# Patient Record
Sex: FEMALE | Race: WHITE | NOT HISPANIC OR LATINO | Employment: UNEMPLOYED | URBAN - METROPOLITAN AREA
[De-identification: names, ages, dates, MRNs, and addresses within clinical notes are randomized per-mention and may not be internally consistent; named-entity substitution may affect disease eponyms.]

---

## 2020-01-06 ENCOUNTER — APPOINTMENT (OUTPATIENT)
Dept: CT IMAGING | Facility: HOSPITAL | Age: 30
End: 2020-01-06

## 2020-01-06 ENCOUNTER — HOSPITAL ENCOUNTER (INPATIENT)
Facility: HOSPITAL | Age: 30
LOS: 4 days | Discharge: HOME | End: 2020-01-10
Attending: EMERGENCY MEDICINE | Admitting: HOSPITALIST

## 2020-01-06 DIAGNOSIS — F11.93 OPIATE WITHDRAWAL (HCC): ICD-10-CM

## 2020-01-06 DIAGNOSIS — Z99.81 DEPENDENCE ON NOCTURNAL OXYGEN THERAPY: ICD-10-CM

## 2020-01-06 DIAGNOSIS — N12 PYELONEPHRITIS: Primary | ICD-10-CM

## 2020-01-06 DIAGNOSIS — F13.939 BENZODIAZEPINE WITHDRAWAL WITH COMPLICATION (HCC): ICD-10-CM

## 2020-01-06 LAB
ALBUMIN SERPL-MCNC: 3.1 G/DL (ref 3.5–5.2)
ALBUMIN/GLOB SERPL: 0.8 G/DL
ALP SERPL-CCNC: 87 U/L (ref 39–117)
ALT SERPL W P-5'-P-CCNC: 20 U/L (ref 1–33)
AMPHET+METHAMPHET UR QL: NEGATIVE
AMPHETAMINES UR QL: NEGATIVE
ANION GAP SERPL CALCULATED.3IONS-SCNC: 17.7 MMOL/L (ref 5–15)
AST SERPL-CCNC: 16 U/L (ref 1–32)
B-HCG UR QL: NEGATIVE
BACTERIA UR QL AUTO: ABNORMAL /HPF
BARBITURATES UR QL SCN: NEGATIVE
BASOPHILS # BLD AUTO: 0.01 10*3/MM3 (ref 0–0.2)
BASOPHILS NFR BLD AUTO: 0.1 % (ref 0–1.5)
BENZODIAZ UR QL SCN: POSITIVE
BILIRUB SERPL-MCNC: 0.6 MG/DL (ref 0.2–1.2)
BILIRUB UR QL STRIP: NEGATIVE
BUN BLD-MCNC: 10 MG/DL (ref 6–20)
BUN/CREAT SERPL: 9.9 (ref 7–25)
BUPRENORPHINE SERPL-MCNC: NEGATIVE NG/ML
CALCIUM SPEC-SCNC: 7.8 MG/DL (ref 8.6–10.5)
CANNABINOIDS SERPL QL: NEGATIVE
CHLORIDE SERPL-SCNC: 89 MMOL/L (ref 98–107)
CLARITY UR: CLEAR
CO2 SERPL-SCNC: 23.3 MMOL/L (ref 22–29)
COCAINE UR QL: NEGATIVE
COLOR UR: YELLOW
CREAT BLD-MCNC: 1.01 MG/DL (ref 0.57–1)
CRP SERPL-MCNC: 29.41 MG/DL (ref 0–0.5)
D-LACTATE SERPL-SCNC: 0.9 MMOL/L (ref 0.5–2)
DEPRECATED RDW RBC AUTO: 48.9 FL (ref 37–54)
EOSINOPHIL # BLD AUTO: 0 10*3/MM3 (ref 0–0.4)
EOSINOPHIL NFR BLD AUTO: 0 % (ref 0.3–6.2)
ERYTHROCYTE [DISTWIDTH] IN BLOOD BY AUTOMATED COUNT: 15 % (ref 12.3–15.4)
ERYTHROCYTE [SEDIMENTATION RATE] IN BLOOD: 41 MM/HR (ref 0–20)
ETHANOL BLD-MCNC: <10 MG/DL (ref 0–10)
ETHANOL UR QL: <0.01 %
FLUAV AG NPH QL: NEGATIVE
FLUBV AG NPH QL IA: NEGATIVE
GFR SERPL CREATININE-BSD FRML MDRD: 65 ML/MIN/1.73
GLOBULIN UR ELPH-MCNC: 3.7 GM/DL
GLUCOSE BLD-MCNC: 132 MG/DL (ref 65–99)
GLUCOSE UR STRIP-MCNC: NEGATIVE MG/DL
HBA1C MFR BLD: 5 % (ref 4.8–5.6)
HCT VFR BLD AUTO: 34 % (ref 34–46.6)
HGB BLD-MCNC: 11.3 G/DL (ref 12–15.9)
HGB UR QL STRIP.AUTO: ABNORMAL
HYALINE CASTS UR QL AUTO: ABNORMAL /LPF
IMM GRANULOCYTES # BLD AUTO: 0.13 10*3/MM3 (ref 0–0.05)
IMM GRANULOCYTES NFR BLD AUTO: 0.8 % (ref 0–0.5)
KETONES UR QL STRIP: NEGATIVE
LEUKOCYTE ESTERASE UR QL STRIP.AUTO: ABNORMAL
LIPASE SERPL-CCNC: 6 U/L (ref 13–60)
LYMPHOCYTES # BLD AUTO: 0.51 10*3/MM3 (ref 0.7–3.1)
LYMPHOCYTES NFR BLD AUTO: 3.1 % (ref 19.6–45.3)
MCH RBC QN AUTO: 29 PG (ref 26.6–33)
MCHC RBC AUTO-ENTMCNC: 33.2 G/DL (ref 31.5–35.7)
MCV RBC AUTO: 87.4 FL (ref 79–97)
METHADONE UR QL SCN: POSITIVE
MONOCYTES # BLD AUTO: 1.41 10*3/MM3 (ref 0.1–0.9)
MONOCYTES NFR BLD AUTO: 8.6 % (ref 5–12)
NEUTROPHILS # BLD AUTO: 14.33 10*3/MM3 (ref 1.7–7)
NEUTROPHILS NFR BLD AUTO: 87.4 % (ref 42.7–76)
NITRITE UR QL STRIP: POSITIVE
OPIATES UR QL: NEGATIVE
OXYCODONE UR QL SCN: NEGATIVE
PCP UR QL SCN: NEGATIVE
PH UR STRIP.AUTO: 6 [PH] (ref 4.5–8)
PLATELET # BLD AUTO: 133 10*3/MM3 (ref 140–450)
PMV BLD AUTO: 11.4 FL (ref 6–12)
POTASSIUM BLD-SCNC: 2.6 MMOL/L (ref 3.5–5.2)
PROCALCITONIN SERPL-MCNC: 5.17 NG/ML (ref 0.1–0.25)
PROPOXYPH UR QL: NEGATIVE
PROT SERPL-MCNC: 6.8 G/DL (ref 6–8.5)
PROT UR QL STRIP: ABNORMAL
RBC # BLD AUTO: 3.89 10*6/MM3 (ref 3.77–5.28)
RBC # UR: ABNORMAL /HPF
REF LAB TEST METHOD: ABNORMAL
S PYO AG THROAT QL: NEGATIVE
SODIUM BLD-SCNC: 130 MMOL/L (ref 136–145)
SP GR UR STRIP: 1.01 (ref 1–1.03)
SQUAMOUS #/AREA URNS HPF: ABNORMAL /HPF
TRICYCLICS UR QL SCN: NEGATIVE
TSH SERPL DL<=0.05 MIU/L-ACNC: 0.32 UIU/ML (ref 0.27–4.2)
UROBILINOGEN UR QL STRIP: ABNORMAL
WBC NRBC COR # BLD: 16.39 10*3/MM3 (ref 3.4–10.8)
WBC UR QL AUTO: ABNORMAL /HPF

## 2020-01-06 PROCEDURE — 25010000002 LORAZEPAM PER 2 MG: Performed by: EMERGENCY MEDICINE

## 2020-01-06 PROCEDURE — 87088 URINE BACTERIA CULTURE: CPT | Performed by: EMERGENCY MEDICINE

## 2020-01-06 PROCEDURE — 25010000002 VANCOMYCIN PER 500 MG: Performed by: NURSE PRACTITIONER

## 2020-01-06 PROCEDURE — 86140 C-REACTIVE PROTEIN: CPT | Performed by: NURSE PRACTITIONER

## 2020-01-06 PROCEDURE — 84145 PROCALCITONIN (PCT): CPT | Performed by: NURSE PRACTITIONER

## 2020-01-06 PROCEDURE — 87880 STREP A ASSAY W/OPTIC: CPT | Performed by: EMERGENCY MEDICINE

## 2020-01-06 PROCEDURE — 74176 CT ABD & PELVIS W/O CONTRAST: CPT

## 2020-01-06 PROCEDURE — 94760 N-INVAS EAR/PLS OXIMETRY 1: CPT

## 2020-01-06 PROCEDURE — 87040 BLOOD CULTURE FOR BACTERIA: CPT | Performed by: NURSE PRACTITIONER

## 2020-01-06 PROCEDURE — 84443 ASSAY THYROID STIM HORMONE: CPT | Performed by: NURSE PRACTITIONER

## 2020-01-06 PROCEDURE — 99283 EMERGENCY DEPT VISIT LOW MDM: CPT | Performed by: EMERGENCY MEDICINE

## 2020-01-06 PROCEDURE — 87081 CULTURE SCREEN ONLY: CPT | Performed by: EMERGENCY MEDICINE

## 2020-01-06 PROCEDURE — 87186 SC STD MICRODIL/AGAR DIL: CPT | Performed by: EMERGENCY MEDICINE

## 2020-01-06 PROCEDURE — 25010000002 CEFTRIAXONE SODIUM-DEXTROSE 1-3.74 GM-%(50ML) RECONSTITUTED SOLUTION: Performed by: EMERGENCY MEDICINE

## 2020-01-06 PROCEDURE — 25010000002 DICYCLOMINE PER 20 MG: Performed by: EMERGENCY MEDICINE

## 2020-01-06 PROCEDURE — 25010000002 VANCOMYCIN 750 MG RECONSTITUTED SOLUTION 1 EACH VIAL: Performed by: NURSE PRACTITIONER

## 2020-01-06 PROCEDURE — 94799 UNLISTED PULMONARY SVC/PX: CPT

## 2020-01-06 PROCEDURE — 85025 COMPLETE CBC W/AUTO DIFF WBC: CPT | Performed by: EMERGENCY MEDICINE

## 2020-01-06 PROCEDURE — 80053 COMPREHEN METABOLIC PANEL: CPT | Performed by: EMERGENCY MEDICINE

## 2020-01-06 PROCEDURE — 83605 ASSAY OF LACTIC ACID: CPT | Performed by: NURSE PRACTITIONER

## 2020-01-06 PROCEDURE — 87804 INFLUENZA ASSAY W/OPTIC: CPT | Performed by: EMERGENCY MEDICINE

## 2020-01-06 PROCEDURE — 25010000003 POTASSIUM CHLORIDE 10 MEQ/100ML SOLUTION: Performed by: EMERGENCY MEDICINE

## 2020-01-06 PROCEDURE — 87086 URINE CULTURE/COLONY COUNT: CPT | Performed by: EMERGENCY MEDICINE

## 2020-01-06 PROCEDURE — 81025 URINE PREGNANCY TEST: CPT | Performed by: EMERGENCY MEDICINE

## 2020-01-06 PROCEDURE — 99223 1ST HOSP IP/OBS HIGH 75: CPT | Performed by: NURSE PRACTITIONER

## 2020-01-06 PROCEDURE — 83036 HEMOGLOBIN GLYCOSYLATED A1C: CPT | Performed by: NURSE PRACTITIONER

## 2020-01-06 PROCEDURE — 81015 MICROSCOPIC EXAM OF URINE: CPT | Performed by: EMERGENCY MEDICINE

## 2020-01-06 PROCEDURE — 80307 DRUG TEST PRSMV CHEM ANLYZR: CPT | Performed by: EMERGENCY MEDICINE

## 2020-01-06 PROCEDURE — 83690 ASSAY OF LIPASE: CPT | Performed by: EMERGENCY MEDICINE

## 2020-01-06 PROCEDURE — 25010000002 ONDANSETRON PER 1 MG: Performed by: EMERGENCY MEDICINE

## 2020-01-06 PROCEDURE — 99285 EMERGENCY DEPT VISIT HI MDM: CPT

## 2020-01-06 PROCEDURE — 81003 URINALYSIS AUTO W/O SCOPE: CPT | Performed by: EMERGENCY MEDICINE

## 2020-01-06 PROCEDURE — 85652 RBC SED RATE AUTOMATED: CPT | Performed by: NURSE PRACTITIONER

## 2020-01-06 RX ORDER — CHOLECALCIFEROL (VITAMIN D3) 125 MCG
5 CAPSULE ORAL NIGHTLY PRN
Status: DISCONTINUED | OUTPATIENT
Start: 2020-01-06 | End: 2020-01-10 | Stop reason: HOSPADM

## 2020-01-06 RX ORDER — POTASSIUM CHLORIDE 7.45 MG/ML
10 INJECTION INTRAVENOUS ONCE
Status: COMPLETED | OUTPATIENT
Start: 2020-01-06 | End: 2020-01-06

## 2020-01-06 RX ORDER — DICYCLOMINE HYDROCHLORIDE 10 MG/ML
20 INJECTION INTRAMUSCULAR ONCE
Status: COMPLETED | OUTPATIENT
Start: 2020-01-06 | End: 2020-01-06

## 2020-01-06 RX ORDER — METHADONE HYDROCHLORIDE 10 MG/1
30 TABLET ORAL EVERY 6 HOURS PRN
Status: ON HOLD | COMMUNITY
End: 2020-01-06

## 2020-01-06 RX ORDER — CARBAMAZEPINE 100 MG/1
200 CAPSULE, EXTENDED RELEASE ORAL EVERY 12 HOURS SCHEDULED
Status: DISCONTINUED | OUTPATIENT
Start: 2020-01-06 | End: 2020-01-07 | Stop reason: CLARIF

## 2020-01-06 RX ORDER — FLUOXETINE HYDROCHLORIDE 20 MG/1
20 CAPSULE ORAL DAILY
Status: DISCONTINUED | OUTPATIENT
Start: 2020-01-07 | End: 2020-01-06

## 2020-01-06 RX ORDER — CEFTRIAXONE 1 G/50ML
1 INJECTION, SOLUTION INTRAVENOUS ONCE
Status: COMPLETED | OUTPATIENT
Start: 2020-01-06 | End: 2020-01-06

## 2020-01-06 RX ORDER — ONDANSETRON 2 MG/ML
4 INJECTION INTRAMUSCULAR; INTRAVENOUS ONCE
Status: COMPLETED | OUTPATIENT
Start: 2020-01-06 | End: 2020-01-06

## 2020-01-06 RX ORDER — ACETAMINOPHEN 500 MG
TABLET ORAL
Status: DISPENSED
Start: 2020-01-06 | End: 2020-01-07

## 2020-01-06 RX ORDER — LORAZEPAM 2 MG/ML
1 INJECTION INTRAMUSCULAR ONCE
Status: COMPLETED | OUTPATIENT
Start: 2020-01-06 | End: 2020-01-06

## 2020-01-06 RX ORDER — FLUOXETINE HYDROCHLORIDE 20 MG/1
20 CAPSULE ORAL DAILY
Status: DISCONTINUED | OUTPATIENT
Start: 2020-01-06 | End: 2020-01-10 | Stop reason: HOSPADM

## 2020-01-06 RX ORDER — ALPRAZOLAM 1 MG/1
1 TABLET ORAL 2 TIMES DAILY
Status: ON HOLD | COMMUNITY
End: 2020-01-10 | Stop reason: SDUPTHER

## 2020-01-06 RX ORDER — POTASSIUM CHLORIDE 7.45 MG/ML
10 INJECTION INTRAVENOUS
Status: DISCONTINUED | OUTPATIENT
Start: 2020-01-06 | End: 2020-01-10 | Stop reason: HOSPADM

## 2020-01-06 RX ORDER — FLUOXETINE HYDROCHLORIDE 20 MG/1
20 CAPSULE ORAL DAILY
Status: ON HOLD | COMMUNITY
End: 2020-01-10 | Stop reason: SDUPTHER

## 2020-01-06 RX ORDER — ACETAMINOPHEN 500 MG
1000 TABLET ORAL EVERY 6 HOURS PRN
Status: DISCONTINUED | OUTPATIENT
Start: 2020-01-06 | End: 2020-01-10 | Stop reason: HOSPADM

## 2020-01-06 RX ORDER — ONDANSETRON 4 MG/1
4 TABLET, FILM COATED ORAL EVERY 6 HOURS PRN
Status: DISCONTINUED | OUTPATIENT
Start: 2020-01-06 | End: 2020-01-10 | Stop reason: HOSPADM

## 2020-01-06 RX ORDER — ONDANSETRON 2 MG/ML
4 INJECTION INTRAMUSCULAR; INTRAVENOUS EVERY 6 HOURS PRN
Status: DISCONTINUED | OUTPATIENT
Start: 2020-01-06 | End: 2020-01-07

## 2020-01-06 RX ORDER — MAGNESIUM SULFATE 1 G/100ML
1 INJECTION INTRAVENOUS AS NEEDED
Status: DISCONTINUED | OUTPATIENT
Start: 2020-01-06 | End: 2020-01-10 | Stop reason: HOSPADM

## 2020-01-06 RX ORDER — BISACODYL 5 MG/1
5 TABLET, DELAYED RELEASE ORAL DAILY PRN
Status: DISCONTINUED | OUTPATIENT
Start: 2020-01-06 | End: 2020-01-10 | Stop reason: HOSPADM

## 2020-01-06 RX ORDER — POTASSIUM CHLORIDE 1.5 G/1.77G
40 POWDER, FOR SOLUTION ORAL AS NEEDED
Status: DISCONTINUED | OUTPATIENT
Start: 2020-01-06 | End: 2020-01-10 | Stop reason: HOSPADM

## 2020-01-06 RX ORDER — LORAZEPAM 2 MG/ML
1 INJECTION INTRAMUSCULAR ONCE
Status: DISCONTINUED | OUTPATIENT
Start: 2020-01-06 | End: 2020-01-06

## 2020-01-06 RX ORDER — BISACODYL 10 MG
10 SUPPOSITORY, RECTAL RECTAL DAILY PRN
Status: DISCONTINUED | OUTPATIENT
Start: 2020-01-06 | End: 2020-01-10 | Stop reason: HOSPADM

## 2020-01-06 RX ORDER — POTASSIUM CHLORIDE 20 MEQ/1
40 TABLET, EXTENDED RELEASE ORAL AS NEEDED
Status: DISCONTINUED | OUTPATIENT
Start: 2020-01-06 | End: 2020-01-10 | Stop reason: HOSPADM

## 2020-01-06 RX ORDER — SODIUM CHLORIDE 9 MG/ML
100 INJECTION, SOLUTION INTRAVENOUS CONTINUOUS
Status: DISCONTINUED | OUTPATIENT
Start: 2020-01-06 | End: 2020-01-08

## 2020-01-06 RX ORDER — SODIUM CHLORIDE 0.9 % (FLUSH) 0.9 %
10 SYRINGE (ML) INJECTION EVERY 12 HOURS SCHEDULED
Status: DISCONTINUED | OUTPATIENT
Start: 2020-01-06 | End: 2020-01-10 | Stop reason: HOSPADM

## 2020-01-06 RX ORDER — FAMOTIDINE 20 MG/1
40 TABLET, FILM COATED ORAL DAILY
Status: DISCONTINUED | OUTPATIENT
Start: 2020-01-06 | End: 2020-01-07

## 2020-01-06 RX ORDER — HYDROMORPHONE HCL 110MG/55ML
0.5 PATIENT CONTROLLED ANALGESIA SYRINGE INTRAVENOUS ONCE
Status: DISCONTINUED | OUTPATIENT
Start: 2020-01-06 | End: 2020-01-06

## 2020-01-06 RX ORDER — MAGNESIUM SULFATE HEPTAHYDRATE 40 MG/ML
4 INJECTION, SOLUTION INTRAVENOUS AS NEEDED
Status: DISCONTINUED | OUTPATIENT
Start: 2020-01-06 | End: 2020-01-10 | Stop reason: HOSPADM

## 2020-01-06 RX ORDER — CHOLECALCIFEROL (VITAMIN D3) 125 MCG
10 CAPSULE ORAL NIGHTLY
Status: DISCONTINUED | OUTPATIENT
Start: 2020-01-06 | End: 2020-01-10 | Stop reason: HOSPADM

## 2020-01-06 RX ORDER — ACETAMINOPHEN 500 MG
1000 TABLET ORAL ONCE
Status: COMPLETED | OUTPATIENT
Start: 2020-01-06 | End: 2020-01-06

## 2020-01-06 RX ORDER — CEFTRIAXONE 2 G/50ML
2 INJECTION, SOLUTION INTRAVENOUS EVERY 24 HOURS
Status: DISCONTINUED | OUTPATIENT
Start: 2020-01-07 | End: 2020-01-09

## 2020-01-06 RX ORDER — ALPRAZOLAM 0.25 MG/1
0.5 TABLET ORAL 4 TIMES DAILY PRN
Status: DISCONTINUED | OUTPATIENT
Start: 2020-01-06 | End: 2020-01-07

## 2020-01-06 RX ORDER — ONDANSETRON 2 MG/ML
4 INJECTION INTRAMUSCULAR; INTRAVENOUS ONCE
Status: DISCONTINUED | OUTPATIENT
Start: 2020-01-06 | End: 2020-01-06

## 2020-01-06 RX ORDER — CARBAMAZEPINE 200 MG/1
TABLET ORAL
Status: COMPLETED
Start: 2020-01-06 | End: 2020-01-06

## 2020-01-06 RX ORDER — ALPRAZOLAM 0.25 MG/1
0.5 TABLET ORAL ONCE
Status: COMPLETED | OUTPATIENT
Start: 2020-01-06 | End: 2020-01-06

## 2020-01-06 RX ORDER — MAGNESIUM SULFATE HEPTAHYDRATE 40 MG/ML
2 INJECTION, SOLUTION INTRAVENOUS AS NEEDED
Status: DISCONTINUED | OUTPATIENT
Start: 2020-01-06 | End: 2020-01-10 | Stop reason: HOSPADM

## 2020-01-06 RX ORDER — SODIUM CHLORIDE 9 MG/ML
40 INJECTION, SOLUTION INTRAVENOUS AS NEEDED
Status: DISCONTINUED | OUTPATIENT
Start: 2020-01-06 | End: 2020-01-10 | Stop reason: HOSPADM

## 2020-01-06 RX ORDER — POTASSIUM CHLORIDE 1.5 G/1.77G
POWDER, FOR SOLUTION ORAL
Status: DISPENSED
Start: 2020-01-06 | End: 2020-01-06

## 2020-01-06 RX ORDER — POTASSIUM CHLORIDE 1.5 G/1.77G
40 POWDER, FOR SOLUTION ORAL ONCE
Status: DISCONTINUED | OUTPATIENT
Start: 2020-01-06 | End: 2020-01-10 | Stop reason: HOSPADM

## 2020-01-06 RX ORDER — SODIUM CHLORIDE 0.9 % (FLUSH) 0.9 %
10 SYRINGE (ML) INJECTION AS NEEDED
Status: DISCONTINUED | OUTPATIENT
Start: 2020-01-06 | End: 2020-01-10 | Stop reason: HOSPADM

## 2020-01-06 RX ADMIN — VANCOMYCIN HYDROCHLORIDE 1250 MG: 750 INJECTION, POWDER, LYOPHILIZED, FOR SOLUTION INTRAVENOUS at 16:47

## 2020-01-06 RX ADMIN — POTASSIUM CHLORIDE 40 MEQ: 1500 TABLET, EXTENDED RELEASE ORAL at 18:19

## 2020-01-06 RX ADMIN — POTASSIUM CHLORIDE 10 MEQ: 10 INJECTION, SOLUTION INTRAVENOUS at 12:50

## 2020-01-06 RX ADMIN — SODIUM CHLORIDE 100 ML/HR: 9 INJECTION, SOLUTION INTRAVENOUS at 16:58

## 2020-01-06 RX ADMIN — CARBAMAZEPINE 200 MG: 200 TABLET ORAL at 21:51

## 2020-01-06 RX ADMIN — MELATONIN TAB 5 MG 10 MG: 5 TAB at 21:52

## 2020-01-06 RX ADMIN — ALPRAZOLAM 0.5 MG: 0.25 TABLET ORAL at 15:18

## 2020-01-06 RX ADMIN — POTASSIUM CHLORIDE 40 MEQ: 1500 TABLET, EXTENDED RELEASE ORAL at 21:58

## 2020-01-06 RX ADMIN — LORAZEPAM 1 MG: 2 INJECTION INTRAMUSCULAR; INTRAVENOUS at 08:29

## 2020-01-06 RX ADMIN — FLUOXETINE 20 MG: 20 CAPSULE ORAL at 21:51

## 2020-01-06 RX ADMIN — ONDANSETRON 4 MG: 2 INJECTION, SOLUTION INTRAMUSCULAR; INTRAVENOUS at 08:29

## 2020-01-06 RX ADMIN — ALPRAZOLAM 0.5 MG: 0.25 TABLET ORAL at 21:51

## 2020-01-06 RX ADMIN — ACETAMINOPHEN 1000 MG: 500 TABLET, FILM COATED ORAL at 14:23

## 2020-01-06 RX ADMIN — DICYCLOMINE HYDROCHLORIDE 20 MG: 20 INJECTION, SOLUTION INTRAMUSCULAR at 10:04

## 2020-01-06 RX ADMIN — CEFTRIAXONE 1 G: 1 INJECTION, SOLUTION INTRAVENOUS at 14:18

## 2020-01-06 RX ADMIN — POTASSIUM CHLORIDE 10 MEQ: 10 INJECTION, SOLUTION INTRAVENOUS at 12:19

## 2020-01-06 RX ADMIN — SODIUM CHLORIDE, PRESERVATIVE FREE 10 ML: 5 INJECTION INTRAVENOUS at 21:54

## 2020-01-06 NOTE — ED NOTES
"Grandmother at bedside stated she needs to go, now that admission plan is in place.  She and pt stated they would like the code word to be \"Huey\" for grandmother to call and get information over the phone.     Saida Lugo RN  01/06/20 4602    "

## 2020-01-06 NOTE — ED PROVIDER NOTES
Subjective   History of Present Illness  History of Present Illness    Chief complaint: Vomiting and abdominal pain    Location: Diffuse    Quality/Severity: 10/10 at its worst, 9/10 current    Timing/Onset/Duration: Patient states that she has been vomiting over the last 5 to 6 days    Modifying Factors: Food and drink make it worse, not eating or drinking make it better    Associated Symptoms: Patient complains of mild headache.  She has had a fever of 101.  She has had chills.  No cough.  She has sore throat secondary to vomiting.  No earache or nasal congestion.  No chest pain.  She has had palpitations.  No diarrhea or burning when she urinates.  No vaginal bleeding or discharges.  The first day of her last normal menstrual period was 3 weeks ago no sleep for the last 6 days.    Narrative: This 29-year-old white female who is on methadone, Xanax, and Prozac, presents with vomiting and abdominal pain.  The patient has not had any methadone since 3 days before [place].  She has been trying to wean herself off of it.  She has not had her Xanax or Prozac for a week.  She left [place] without her medications.    PCP: No local provider      Review of Systems   Constitutional: Positive for chills and fever.   HENT: Positive for sore throat. Negative for ear pain.    Eyes: Negative for discharge and redness.   Respiratory: Negative for cough, chest tightness and shortness of breath.    Cardiovascular: Positive for palpitations. Negative for chest pain and leg swelling.   Gastrointestinal: Positive for abdominal pain, nausea and vomiting. Negative for blood in stool, constipation and diarrhea.   Genitourinary: Negative for difficulty urinating, dysuria, vaginal bleeding and vaginal discharge.   Musculoskeletal: Negative for back pain.   Skin: Negative for rash.   Neurological: Positive for headaches.   Psychiatric/Behavioral: Negative.  Negative for confusion.        Medication List      ASK your doctor about these  medications    ALPRAZolam 1 MG tablet  Commonly known as:  XANAX     FLUoxetine 20 MG capsule  Commonly known as:  PROzac     methadone 10 MG tablet  Commonly known as:  DOLOPHINE          Past Medical History:   Diagnosis Date   • Addiction to drug (CMS/HCC)    • Depression        No Known Allergies    History reviewed. No pertinent surgical history.    History reviewed. No pertinent family history.    Social History     Tobacco Use   • Smoking status: Current Every Day Smoker     Packs/day: 1.00   Substance and Sexual Activity   • Alcohol use: Not Currently   • Drug use: Not Currently           Objective   Physical Exam   Constitutional: She is oriented to person, place, and time. She appears well-developed and well-nourished. No distress.   ED Triage Vitals (01/06/20 0746)  Temp: 99.3 °F (37.4 °C)  Heart Rate: (!) 127  Resp: 18  BP: 122/89  SpO2: 97 %  Temp src: n/a  Heart Rate Source: n/a  Patient Position: n/a  BP Location: n/a  FiO2 (%): n/a    The patient's vitals were reviewed by me.  Unless otherwise noted they are within normal limits.     HENT:   Head: Normocephalic and atraumatic.   Right Ear: External ear normal.   Left Ear: External ear normal.   Nose: Nose normal.   Mouth/Throat: No oropharyngeal exudate.   Dry mucous membrane   Eyes: Pupils are equal, round, and reactive to light. Conjunctivae and EOM are normal. Right eye exhibits no discharge. Left eye exhibits no discharge. No scleral icterus.   Neck: Normal range of motion. Neck supple. No JVD present. No tracheal deviation present. No thyromegaly present.   Cardiovascular: Regular rhythm, normal heart sounds and intact distal pulses. Exam reveals no gallop and no friction rub.   No murmur heard.  Pulmonary/Chest: Effort normal and breath sounds normal. No stridor. No respiratory distress. She has no wheezes. She has no rales. She exhibits no tenderness.   Abdominal: Soft. Bowel sounds are normal. She exhibits no distension and no mass. There is  tenderness. There is no rebound and no guarding. No hernia.   Mild diffuse tenderness   Musculoskeletal: Normal range of motion. She exhibits no edema or deformity.   Lymphadenopathy:     She has no cervical adenopathy.   Neurological: She is alert and oriented to person, place, and time.   Skin: Skin is warm and dry. No rash noted. She is not diaphoretic. No erythema. There is pallor.   Psychiatric: Her behavior is normal.   Nursing note and vitals reviewed.      Procedures           ED Course  ED Course as of Jan 06 1257   Mon Jan 06, 2020   1007 The urine microscopic shows 13-20 red blood cells, 6-12 white blood cells, 2+ bacteria, small leukocytes, nitrite positive.  The urine drug screen is positive for benzodiazepines and methadone.  The urine hCG is negative.  The strep screen is negative.  The influenza is negative.  The blood work is pending.  The laboratory values are otherwise unremarkable.    [RC]   1250 The serum glucose is 132.  The creatinine is 1.01.  The sodium is 130.  The potassium is 2.6.  The chloride is 89.  The calcium is 7.8.  The albumin is 3.1.  The anion gap is 17.  The white blood cell count is 16.  The hemoglobin is 11.  The relative neutrophil percent is 87%.  The absolute neutrophil count is 14.  The laboratory values are otherwise unremarkable    [RC]      ED Course User Index  [RC] Arnol Oliver MD   1:23 PM, 01/06/20:  Patient was reassessed.  Her T-max was 100.1.  Her blood pressure was 131/75, heart rate of 123, respirations 16, saturations of 97%.  Abdominal exam: Soft, minimal diffuse tenderness, no rebound, no guarding, no masses, positive bowel sounds.    2:11 PM, 01/06/20:  The patient's diagnosis of pyelonephritis and opiate benzodiazepine withdrawal was discussed with her.  The patient will be admitted for IV antibiotics, fluids and management of her withdrawal symptoms.  All the patient's questions were answered she will be admitted in fair condition.  All the  patient's questions were answered she will be needed in fair condition.    2:12 PM, 01/06/20:  I spoke with Miranda Lozano, the practitioner covering for the hospitalist, she will admit the patient.                                               [facility]  No orders to display     Labs Reviewed - No data to display  No results found.    Final diagnoses:   Pyelonephritis   Opiate withdrawal (CMS/HCC)   Benzodiazepine withdrawal with complication (CMS/[facility])         ED Medications:  Medications - No data to display    New Medications:     Medication List      ASK your doctor about these medications    ALPRAZolam 1 MG tablet  Commonly known as:  XANAX     FLUoxetine 20 MG capsule  Commonly known as:  PROzac     methadone 10 MG tablet  Commonly known as:  DOLOPHINE          Stopped Medications:     Medication List      ASK your doctor about these medications    ALPRAZolam 1 MG tablet  Commonly known as:  XANAX     FLUoxetine 20 MG capsule  Commonly known as:  PROzac     methadone 10 MG tablet  Commonly known as:  DOLOPHINE            Final diagnoses:   Pyelonephritis   Opiate withdrawal (CMS/HCC)   Benzodiazepine withdrawal with complication (CMS/[facility])            Arnol Oliver MD  01/06/20 1417       Arnol Oliver MD  01/06/20 1410

## 2020-01-06 NOTE — ED NOTES
Unable to obtain blood with phlebotomist shayy 2.  MD aware.  Lab states they will send down a different phlebotomist      Alexandra Tovar RN  01/06/20 9001

## 2020-01-06 NOTE — ED NOTES
Pt asked for something to help her go to sleep, said she has not slept in six days.     Saida Lugo RN  01/06/20 8549

## 2020-01-06 NOTE — PROGRESS NOTES
Pharmacokinetic Consult - Vancomycin Dosing    Talia Root is a 29 y.o. female who has been consulted for Vancomycin dosing for UTI / intra-abdominal infection.    Relevant clinical data and objective history reviewed:  Lab Results   Component Value Date/Time    CREATININE 1.01 (H) 01/06/2020 11:11 AM    BUN 10 01/06/2020 11:11 AM     Lab Results   Component Value Date/Time    WBC 16.39 (H) 01/06/2020 11:11 AM    HGB 11.3 (L) 01/06/2020 11:11 AM    HCT 34.0 01/06/2020 11:11 AM    MCV 87.4 01/06/2020 11:11 AM     (L) 01/06/2020 11:11 AM     Temp Readings from Last 3 Encounters:   01/06/20 100.1 °F (37.8 °C) (Oral)     Weight: 77.1 kg (170 lb)    Assessment/Plan  The patient will be started on Vancomycin 1250 mg every 12 hours. Will draw Vancomycin trough with 4th dose. Due to infection severity, will target trough of 15-20 mcg/ml. Pharmacy will continue to follow the patient’s culture results and clinical progress daily.    Trev June, [facility], PharmD

## 2020-01-06 NOTE — ED NOTES
"Pt stated \"I think my fever is back.\" I took her temperature and it was 100 orally. Dr. Oliver notifed.     Silvana Villeda  01/06/20 0907    "

## 2020-01-06 NOTE — H&P
"[facility] HOSPITALIST     Provider, No Known    CHIEF COMPLAINT: n/v/abdominal pain    HISTORY OF PRESENT ILLNESS:  The patient is a 29-year-old female that presented to the emergency department secondary to 6 days of fever, chills, nausea, vomiting, abdominal pain, back pain, dysuria.  She notes she moved here from [place] 6 days ago.  She reports she has a past history of IV drug abuse with heroin approximately 5 years ago.  She has been on methadone and Xanax last doses before [place].      She reports a history of skin abscesses in past, depression, IVDA.  She reports she is not sexually active, LMP approximately 2 weeks ago, no history of STDs.    She otherwise denies headache/rhinorrhea/nasal congestion/lightheadedness/syncopal sensation/cough/soa/diarrhea/chest pain/recent illness/sick exposures/change in bowel or bladder habits/no weight change/bloody emesis or bloody stools/change in medications or any other new concerns.    Past Medical History:   Diagnosis Date   • Addiction to drug (CMS/[facility])    • Depression      History reviewed. No pertinent surgical history.  History reviewed. No pertinent family history.  Social History     Tobacco Use   • Smoking status: Current Every Day Smoker     Packs/day: 1.00   Substance Use Topics   • Alcohol use: Not Currently   • Drug use: Not Currently       (Not in a hospital admission)  Allergies:  Patient has no known allergies.      There is no immunization history on file for this patient.    REVIEW OF SYSTEMS:  Please see the above history of present illness for pertinent positives and negatives.  The remainder of the patient's systems have been reviewed and are negative.    Vital Signs  Temp:  [99.3 °F (37.4 °C)-100.1 °F (37.8 °C)] 100.1 °F (37.8 °C)  Heart Rate:  [108-128] 118  Resp:  [16-18] 16  BP: (104-146)/() 130/79   Body mass index is 30.11 kg/m².    Flowsheet Rows      First Filed Value   Admission Height  160 cm (63\") Documented at " 01/06/2020 0746   Admission Weight  77.1 kg (170 lb) Documented at 01/06/2020 0746           Physical Exam   Constitutional: She is oriented to person, place, and time. She appears well-developed and well-nourished.   HENT:   Head: Normocephalic and atraumatic.   Eyes: Pupils are equal, round, and reactive to light. EOM are normal.   Pupils bilaterally 5 mm   Cardiovascular:   Tachycardic, regular   Pulmonary/Chest: Effort normal and breath sounds normal. No stridor. No respiratory distress. She has no wheezes.   Abdominal: Soft. Bowel sounds are normal. She exhibits no distension. There is no tenderness. There is no guarding.   Musculoskeletal: She exhibits no edema.   Neurological: She is alert and oriented to person, place, and time.   Skin: Skin is warm and dry. No erythema.   Extensive tattooing of skin noted   Psychiatric: She has a normal mood and affect. Her behavior is normal.   Vitals reviewed.    Emotional Behavior:    Judgement and Insight: Good   Mental Status:  Alertness alert   Memory: Good   Mood and Affect:         Depression anxious               Anxiety anxious    Debilities:   Physical Weakness none   Handicaps none   Disabilities none   Agitation mild     Results Review:    I reviewed the patient's new clinical results.  Lab Results (most recent)     Procedure Component Value Units Date/Time    Comprehensive Metabolic Panel [[id number]]  (Abnormal) Collected:  01/06/20 1111    Specimen:  Blood Updated:  01/06/20 1141     Glucose 132 mg/dL      BUN 10 mg/dL      Creatinine 1.01 mg/dL      Sodium 130 mmol/L      Potassium 2.6 mmol/L      Chloride 89 mmol/L      CO2 23.3 mmol/L      Calcium 7.8 mg/dL      Total Protein 6.8 g/dL      Albumin 3.10 g/dL      ALT (SGPT) 20 U/L      AST (SGOT) 16 U/L      Alkaline Phosphatase 87 U/L      Total Bilirubin 0.6 mg/dL      eGFR Non African Amer 65 mL/min/1.73      Globulin 3.7 gm/dL      A/G Ratio 0.8 g/dL      BUN/Creatinine Ratio 9.9     Anion Gap 17.7  mmol/L     Narrative:       GFR Normal >60  Chronic Kidney Disease <60  Kidney Failure <15      Lipase [[id number]]  (Abnormal) Collected:  01/06/20 1111    Specimen:  Blood Updated:  01/06/20 1137     Lipase 6 U/L     Ethanol [[id number]] Collected:  01/06/20 1111    Specimen:  Blood Updated:  01/06/20 1137     Ethanol <10 mg/dL      Ethanol % <0.010 %     Urine Culture - Urine, Urine, Clean Catch [[id number]] Collected:  01/06/20 0833    Specimen:  Urine, Clean Catch Updated:  01/06/20 1119    CBC & Differential [[id number]] Collected:  01/06/20 1111    Specimen:  Blood Updated:  01/06/20 1117    Narrative:       The following orders were created for panel order CBC & Differential.  Procedure                               Abnormality         Status                     ---------                               -----------         ------                     CBC Auto Differential[[id number]]        Abnormal            Final result                 Please view results for these tests on the individual orders.    CBC Auto Differential [[id number]]  (Abnormal) Collected:  01/06/20 1111    Specimen:  Blood Updated:  01/06/20 1117     WBC 16.39 10*3/mm3      RBC 3.89 10*6/mm3      Hemoglobin 11.3 g/dL      Hematocrit 34.0 %      MCV 87.4 fL      MCH 29.0 pg      MCHC 33.2 g/dL      RDW 15.0 %      RDW-SD 48.9 fl      MPV 11.4 fL      Platelets 133 10*3/mm3      Neutrophil % 87.4 %      Lymphocyte % 3.1 %      Monocyte % 8.6 %      Eosinophil % 0.0 %      Basophil % 0.1 %      Immature Grans % 0.8 %      Neutrophils, Absolute 14.33 10*3/mm3      Lymphocytes, Absolute 0.51 10*3/mm3      Monocytes, Absolute 1.41 10*3/mm3      Eosinophils, Absolute 0.00 10*3/mm3      Basophils, Absolute 0.01 10*3/mm3      Immature Grans, Absolute 0.13 10*3/mm3     Urinalysis, Microscopic Only - Urine, Clean Catch [[id number]]  (Abnormal) Collected:  01/06/20 0833    Specimen:  Urine, Clean Catch Updated:  01/06/20 0942     RBC, UA 13-20 /HPF      WBC,  UA 6-12 /HPF      Bacteria, UA 2+ /HPF      Squamous Epithelial Cells, UA 0-2 /HPF      Hyaline Casts, UA None Seen /LPF      Methodology Manual Light Microscopy    Urine Drug Screen - [[id number]]  (Abnormal) Collected:  01/06/20 0833    Specimen:  Urine Updated:  01/06/20 0930     THC, Screen, Urine Negative     Phencyclidine (PCP), Urine Negative     Cocaine Screen, Urine Negative     Methamphetamine, Ur Negative     Opiate Screen Negative     Amphetamine Screen, Urine Negative     Benzodiazepine Screen, Urine Positive     Tricyclic Antidepressants Screen Negative     Methadone Screen, Urine Positive     Barbiturates Screen, Urine Negative     Oxycodone Screen, Urine Negative     Propoxyphene Screen Negative     Buprenorphine, Screen, Urine Negative    Narrative:       Urine drug screen results are to be used for medical purposes only.  They are not to be used for legal purposes such as employment testing.  Negative results do not necessarily mean the complete absence of a subtance, but rather that the result is less than the cutoff for that substance.  Positive results are unconfirmed and considered Preliminary Positive.  [facility] does not automatically confirm Postitive Unconfirmed results.  The physician may request (order) an Unconfirmed Positive result to be sent out for confirmation.      Negative Thresholds for Drugs Screened:    THC screen, urine                          50 ng/ml  Phenycyclidine (PCP), urine                25 ng/ml  Cocaine screen, urine                     150 ng/ml  Methamphetamine, urine                    500 ng/ml  Opiate screen, urine                      100 ng/ml  Amphetamine screen, urine                 500 ng/ml  Benzodiazepine screen, urine              150 ng/ml  Tricyclic Antidepressants screen, urine   300 ng/ml  Methadone screen, urine                   200 ng/ml  Barbiturates screen, urine                200 ng/ml  Oxycodone screen, urine                    100 ng/ml  Propoxyphene screen, urine                300 ng/ml  Buprenorphine screen, urine                10 ng/ml    Pregnancy, Urine - Urine, Clean Catch [[id number]]  (Normal) Collected:  01/06/20 0833    Specimen:  Urine, Clean Catch Updated:  01/06/20 0924     HCG, Urine QL Negative    Urinalysis With Microscopic If Indicated (No Culture) - Urine, Clean Catch [[id number]]  (Abnormal) Collected:  01/06/20 0833    Specimen:  Urine, Clean Catch Updated:  01/06/20 0922     Color, UA Yellow     Appearance, UA Clear     pH, UA 6.0     Specific Gravity, UA 1.010     Glucose, UA Negative     Ketones, UA Negative     Bilirubin, UA Negative     Blood, UA Moderate (2+)     Protein,  mg/dL (2+)     Leuk Esterase, UA Small (1+)     Nitrite, UA Positive     Urobilinogen, UA 0.2 E.U./dL    Rapid Strep A Screen - Swab, Throat [[id number]]  (Normal) Collected:  01/06/20 0833    Specimen:  Swab from Throat Updated:  01/06/20 0922     Strep A Ag Negative    Beta Strep Culture, Throat - Swab, Throat [[id number]] Collected:  01/06/20 0833    Specimen:  Swab from Throat Updated:  01/06/20 0922    Influenza Antigen, Rapid - Swab, Nasopharynx [[id number]]  (Normal) Collected:  01/06/20 0833    Specimen:  Swab from Nasopharynx Updated:  01/06/20 0922     Influenza A Ag, EIA Negative     Influenza B Ag, EIA Negative          Imaging Results (Most Recent)     Procedure Component Value Units Date/Time    CT Abdomen Pelvis Without Contrast [[id number]] Collected:  01/06/20 1352     Updated:  01/06/20 1406    Narrative:       CT abdomen and pelvis without contrast   01/06/2020     HISTORY:  Generalized abdomen pain and vomiting for 6 days     COMPARISON:  NONE     TECHNIQUE:    CT of Abdomen and Pelvis without contrast performed.  Sagittal and  Coronal reconstructions performed. Radiation dose reduction techniques  included automated exposure control or exposure modulation based on body  size. Radiation audit for CT and nuclear cardiology  exams in the last 12  months: 0.      FINDINGS:    Abdomen:  Lung bases are clear. Liver slightly enlarged. There are no focal  lesions. The gallbladder, spleen, pancreas, and adrenal glands are  normal.. The left kidney appears normal. The right kidney may be  slightly swollen with effacement of the fatty tissue around the renal  pelvis. No definite stranding is noted around the kidney. There are no  stones. The aorta is normal in size. The bowel is normal.       Pelvis:  Uterus and right ovary appear normal. Left ovary has a small cyst  measuring 3 cm in diameter. Bones are unremarkable.         Impression:       There is a subtle findings that may represent edema in the  right kidney. Correlation with urinalysis recommended to check for  possible pyelonephritis.. No stones are identified. There is no evidence  of bowel obstruction     Otherwise study is normal except for mild prominence of the liver     This report was finalized on 1/6/2020 2:00 PM by Dr. Td Lindo MD.           reviewed    ECG/EMG Results (most recent)     None        Pending    Assessment/Plan   Sepsis 2/2 acute right pyelonephritis: ([facility], heart rate, source)  H/O skin abscesses:  Check lactic acid, procalcitonin, urine C&S, sed rate, crp  Rocephin 2 g IV daily, add vancomycin pharmacy to dose to cover for MRSA  IV hydration  Monitor    Acute benzodiazepine withdrawal:  Give xanax 0.5 mg now then every 6 hours as needed  CIWA protocol  Seizure precautions    Tachycardia:  Likely 2/2 all above however rule out SBE with history of IV drug abuse    Electrolyte imbalance:  Electrolyte replacement protocol added    Hyponatremia: Likely 2/2 volume depletion  Continue IV hydration, recheck in a.m.    UDS positive for methadone and benzodiazepines  Patient denies use in over 2 weeks    I discussed the patients findings and my recommendations with patient.     Miranda Lozano, APRN  01/06/20  3:13 PM

## 2020-01-07 ENCOUNTER — APPOINTMENT (OUTPATIENT)
Dept: CARDIOLOGY | Facility: HOSPITAL | Age: 30
End: 2020-01-07

## 2020-01-07 LAB
ADV 40+41 DNA STL QL NAA+NON-PROBE: NOT DETECTED
AMYLASE SERPL-CCNC: 17 U/L (ref 28–100)
ANION GAP SERPL CALCULATED.3IONS-SCNC: 14 MMOL/L (ref 5–15)
ASTRO TYP 1-8 RNA STL QL NAA+NON-PROBE: NOT DETECTED
BASOPHILS # BLD AUTO: 0.01 10*3/MM3 (ref 0–0.2)
BASOPHILS NFR BLD AUTO: 0.1 % (ref 0–1.5)
BH CV ECHO MEAS - ACS: 2.1 CM
BH CV ECHO MEAS - AO MAX PG (FULL): 1.5 MMHG
BH CV ECHO MEAS - AO MAX PG: 6.8 MMHG
BH CV ECHO MEAS - AO MEAN PG (FULL): 1.5 MMHG
BH CV ECHO MEAS - AO MEAN PG: 4 MMHG
BH CV ECHO MEAS - AO ROOT AREA (BSA CORRECTED): 1.5
BH CV ECHO MEAS - AO ROOT AREA: 5.7 CM^2
BH CV ECHO MEAS - AO ROOT DIAM: 2.7 CM
BH CV ECHO MEAS - AO V2 MAX: 130 CM/SEC
BH CV ECHO MEAS - AO V2 MEAN: 86.2 CM/SEC
BH CV ECHO MEAS - AO V2 VTI: 23.7 CM
BH CV ECHO MEAS - ASC AORTA: 2.7 CM
BH CV ECHO MEAS - AVA(I,A): 2.7 CM^2
BH CV ECHO MEAS - AVA(I,D): 2.7 CM^2
BH CV ECHO MEAS - AVA(V,A): 2.8 CM^2
BH CV ECHO MEAS - AVA(V,D): 2.8 CM^2
BH CV ECHO MEAS - BSA(HAYCOCK): 1.9 M^2
BH CV ECHO MEAS - BSA: 1.8 M^2
BH CV ECHO MEAS - BZI_BMI: 31.5 KILOGRAMS/M^2
BH CV ECHO MEAS - BZI_METRIC_HEIGHT: 157.5 CM
BH CV ECHO MEAS - BZI_METRIC_WEIGHT: 78 KG
BH CV ECHO MEAS - EDV(CUBED): 81.2 ML
BH CV ECHO MEAS - EDV(MOD-SP2): 129 ML
BH CV ECHO MEAS - EDV(MOD-SP4): 117 ML
BH CV ECHO MEAS - EDV(TEICH): 84.4 ML
BH CV ECHO MEAS - EF(CUBED): 65.4 %
BH CV ECHO MEAS - EF(MOD-BP): 61 %
BH CV ECHO MEAS - EF(MOD-SP2): 59.7 %
BH CV ECHO MEAS - EF(MOD-SP4): 60.6 %
BH CV ECHO MEAS - EF(TEICH): 57.2 %
BH CV ECHO MEAS - ESV(CUBED): 28.1 ML
BH CV ECHO MEAS - ESV(MOD-SP2): 52 ML
BH CV ECHO MEAS - ESV(MOD-SP4): 46.1 ML
BH CV ECHO MEAS - ESV(TEICH): 36.2 ML
BH CV ECHO MEAS - FS: 29.8 %
BH CV ECHO MEAS - IVS/LVPW: 1
BH CV ECHO MEAS - IVSD: 1 CM
BH CV ECHO MEAS - LAT PEAK E' VEL: 12 CM/SEC
BH CV ECHO MEAS - LV DIASTOLIC VOL/BSA (35-75): 65.3 ML/M^2
BH CV ECHO MEAS - LV MASS(C)D: 146.1 GRAMS
BH CV ECHO MEAS - LV MASS(C)DI: 81.5 GRAMS/M^2
BH CV ECHO MEAS - LV MAX PG: 5.3 MMHG
BH CV ECHO MEAS - LV MEAN PG: 2.5 MMHG
BH CV ECHO MEAS - LV SYSTOLIC VOL/BSA (12-30): 25.7 ML/M^2
BH CV ECHO MEAS - LV V1 MAX: 114.5 CM/SEC
BH CV ECHO MEAS - LV V1 MEAN: 73.9 CM/SEC
BH CV ECHO MEAS - LV V1 VTI: 20.2 CM
BH CV ECHO MEAS - LVIDD: 4.3 CM
BH CV ECHO MEAS - LVIDS: 3 CM
BH CV ECHO MEAS - LVLD AP2: 8.4 CM
BH CV ECHO MEAS - LVLD AP4: 8.4 CM
BH CV ECHO MEAS - LVLS AP2: 6.8 CM
BH CV ECHO MEAS - LVLS AP4: 6.8 CM
BH CV ECHO MEAS - LVOT AREA (M): 3.1 CM^2
BH CV ECHO MEAS - LVOT AREA: 3.1 CM^2
BH CV ECHO MEAS - LVOT DIAM: 2 CM
BH CV ECHO MEAS - LVPWD: 1 CM
BH CV ECHO MEAS - MED PEAK E' VEL: 11 CM/SEC
BH CV ECHO MEAS - MV A DUR: 0.13 SEC
BH CV ECHO MEAS - MV A MAX VEL: 48.5 CM/SEC
BH CV ECHO MEAS - MV DEC SLOPE: 436 CM/SEC^2
BH CV ECHO MEAS - MV DEC TIME: 148 SEC
BH CV ECHO MEAS - MV E MAX VEL: 97.5 CM/SEC
BH CV ECHO MEAS - MV E/A: 2
BH CV ECHO MEAS - MV MAX PG: 4 MMHG
BH CV ECHO MEAS - MV MEAN PG: 2 MMHG
BH CV ECHO MEAS - MV P1/2T MAX VEL: 109 CM/SEC
BH CV ECHO MEAS - MV P1/2T: 73.2 MSEC
BH CV ECHO MEAS - MV V2 MAX: 100 CM/SEC
BH CV ECHO MEAS - MV V2 MEAN: 62.8 CM/SEC
BH CV ECHO MEAS - MV V2 VTI: 20.5 CM
BH CV ECHO MEAS - MVA P1/2T LCG: 2 CM^2
BH CV ECHO MEAS - MVA(P1/2T): 3 CM^2
BH CV ECHO MEAS - MVA(VTI): 3.1 CM^2
BH CV ECHO MEAS - PA ACC TIME: 0.1 SEC
BH CV ECHO MEAS - PA MAX PG (FULL): 2.4 MMHG
BH CV ECHO MEAS - PA MAX PG: 3.9 MMHG
BH CV ECHO MEAS - PA PR(ACCEL): 32.7 MMHG
BH CV ECHO MEAS - PA V2 MAX: 98.9 CM/SEC
BH CV ECHO MEAS - PVA(V,A): 1.4 CM^2
BH CV ECHO MEAS - PVA(V,D): 1.4 CM^2
BH CV ECHO MEAS - QP/QS: 0.46
BH CV ECHO MEAS - RAP SYSTOLE: 8 MMHG
BH CV ECHO MEAS - RV MAX PG: 1.5 MMHG
BH CV ECHO MEAS - RV MEAN PG: 1 MMHG
BH CV ECHO MEAS - RV V1 MAX: 60.6 CM/SEC
BH CV ECHO MEAS - RV V1 MEAN: 44.8 CM/SEC
BH CV ECHO MEAS - RV V1 VTI: 12.9 CM
BH CV ECHO MEAS - RVOT AREA: 2.3 CM^2
BH CV ECHO MEAS - RVOT DIAM: 1.7 CM
BH CV ECHO MEAS - RVSP: 40.1 MMHG
BH CV ECHO MEAS - SI(AO): 75.7 ML/M^2
BH CV ECHO MEAS - SI(CUBED): 29.6 ML/M^2
BH CV ECHO MEAS - SI(LVOT): 35.3 ML/M^2
BH CV ECHO MEAS - SI(MOD-SP2): 42.9 ML/M^2
BH CV ECHO MEAS - SI(MOD-SP4): 39.5 ML/M^2
BH CV ECHO MEAS - SI(TEICH): 26.9 ML/M^2
BH CV ECHO MEAS - SV(AO): 135.7 ML
BH CV ECHO MEAS - SV(CUBED): 53.1 ML
BH CV ECHO MEAS - SV(LVOT): 63.3 ML
BH CV ECHO MEAS - SV(MOD-SP2): 77 ML
BH CV ECHO MEAS - SV(MOD-SP4): 70.9 ML
BH CV ECHO MEAS - SV(RVOT): 29.3 ML
BH CV ECHO MEAS - SV(TEICH): 48.3 ML
BH CV ECHO MEAS - TAPSE (>1.6): 2.1 CM
BH CV ECHO MEAS - TR MAX VEL: 278.7 CM/SEC
BH CV ECHO MEASUREMENTS AVERAGE E/E' RATIO: 8.48
BH CV VAS BP RIGHT ARM: NORMAL MMHG
BH CV XLRA - RV BASE: 3.7 CM
BH CV XLRA - TDI S': 12 CM/SEC
BUN BLD-MCNC: 10 MG/DL (ref 6–20)
BUN/CREAT SERPL: 9.5 (ref 7–25)
C CAYETANENSIS DNA STL QL NAA+NON-PROBE: NOT DETECTED
C DIFF GDH STL QL: POSITIVE
CALCIUM SPEC-SCNC: 7.4 MG/DL (ref 8.6–10.5)
CAMPY SP DNA.DIARRHEA STL QL NAA+PROBE: NOT DETECTED
CHLORIDE SERPL-SCNC: 99 MMOL/L (ref 98–107)
CO2 SERPL-SCNC: 22 MMOL/L (ref 22–29)
CREAT BLD-MCNC: 1.05 MG/DL (ref 0.57–1)
CRYPTOSP STL CULT: NOT DETECTED
D-LACTATE SERPL-SCNC: 1.4 MMOL/L (ref 0.5–2)
D-LACTATE SERPL-SCNC: 1.5 MMOL/L (ref 0.5–2)
DEPRECATED RDW RBC AUTO: 50.7 FL (ref 37–54)
E COLI DNA SPEC QL NAA+PROBE: NOT DETECTED
E HISTOLYT AG STL-ACNC: NOT DETECTED
EAEC PAA PLAS AGGR+AATA ST NAA+NON-PRB: NOT DETECTED
EC STX1 + STX2 GENES STL NAA+PROBE: NOT DETECTED
EOSINOPHIL # BLD AUTO: 0.01 10*3/MM3 (ref 0–0.4)
EOSINOPHIL NFR BLD AUTO: 0.1 % (ref 0.3–6.2)
EPEC EAE GENE STL QL NAA+NON-PROBE: NOT DETECTED
ERYTHROCYTE [DISTWIDTH] IN BLOOD BY AUTOMATED COUNT: 15.6 % (ref 12.3–15.4)
ETEC LTA+ST1A+ST1B TOX ST NAA+NON-PROBE: NOT DETECTED
G LAMBLIA DNA SPEC QL NAA+PROBE: NOT DETECTED
GFR SERPL CREATININE-BSD FRML MDRD: 62 ML/MIN/1.73
GLUCOSE BLD-MCNC: 122 MG/DL (ref 65–99)
HCT VFR BLD AUTO: 30.2 % (ref 34–46.6)
HGB BLD-MCNC: 10.1 G/DL (ref 12–15.9)
IMM GRANULOCYTES # BLD AUTO: 0.06 10*3/MM3 (ref 0–0.05)
IMM GRANULOCYTES NFR BLD AUTO: 0.5 % (ref 0–0.5)
LEFT ATRIUM VOLUME INDEX: 24 ML/M2
LIPASE SERPL-CCNC: 14 U/L (ref 13–60)
LYMPHOCYTES # BLD AUTO: 0.92 10*3/MM3 (ref 0.7–3.1)
LYMPHOCYTES NFR BLD AUTO: 7.5 % (ref 19.6–45.3)
MAGNESIUM SERPL-MCNC: 1.3 MG/DL (ref 1.6–2.6)
MAXIMAL PREDICTED HEART RATE: 192 BPM
MCH RBC QN AUTO: 29.6 PG (ref 26.6–33)
MCHC RBC AUTO-ENTMCNC: 33.4 G/DL (ref 31.5–35.7)
MCV RBC AUTO: 88.6 FL (ref 79–97)
MONOCYTES # BLD AUTO: 1.51 10*3/MM3 (ref 0.1–0.9)
MONOCYTES NFR BLD AUTO: 12.2 % (ref 5–12)
NEUTROPHILS # BLD AUTO: 9.83 10*3/MM3 (ref 1.7–7)
NEUTROPHILS NFR BLD AUTO: 79.6 % (ref 42.7–76)
NOROVIRUS GI+II RNA STL QL NAA+NON-PROBE: NOT DETECTED
NRBC BLD AUTO-RTO: 0 /100 WBC (ref 0–0.2)
P SHIGELLOIDES DNA STL QL NAA+PROBE: NOT DETECTED
PLATELET # BLD AUTO: 116 10*3/MM3 (ref 140–450)
PMV BLD AUTO: 11.3 FL (ref 6–12)
POTASSIUM BLD-SCNC: 3.5 MMOL/L (ref 3.5–5.2)
RBC # BLD AUTO: 3.41 10*6/MM3 (ref 3.77–5.28)
RV RNA STL NAA+PROBE: NOT DETECTED
SALMONELLA DNA SPEC QL NAA+PROBE: NOT DETECTED
SAPO I+II+IV+V RNA STL QL NAA+NON-PROBE: NOT DETECTED
SHIGELLA SP+EIEC IPAH STL QL NAA+PROBE: NOT DETECTED
SODIUM BLD-SCNC: 135 MMOL/L (ref 136–145)
STRESS TARGET HR: 163 BPM
V CHOLERAE DNA SPEC QL NAA+PROBE: NOT DETECTED
VIBRIO DNA SPEC NAA+PROBE: NOT DETECTED
WBC NRBC COR # BLD: 12.34 10*3/MM3 (ref 3.4–10.8)
YERSINIA STL CULT: NOT DETECTED

## 2020-01-07 PROCEDURE — 85025 COMPLETE CBC W/AUTO DIFF WBC: CPT | Performed by: NURSE PRACTITIONER

## 2020-01-07 PROCEDURE — 87324 CLOSTRIDIUM AG IA: CPT | Performed by: HOSPITALIST

## 2020-01-07 PROCEDURE — 83605 ASSAY OF LACTIC ACID: CPT | Performed by: NURSE PRACTITIONER

## 2020-01-07 PROCEDURE — 25010000002 PROMETHAZINE PER 50 MG: Performed by: NURSE PRACTITIONER

## 2020-01-07 PROCEDURE — 83690 ASSAY OF LIPASE: CPT | Performed by: NURSE PRACTITIONER

## 2020-01-07 PROCEDURE — 87449 NOS EACH ORGANISM AG IA: CPT | Performed by: HOSPITALIST

## 2020-01-07 PROCEDURE — 83735 ASSAY OF MAGNESIUM: CPT | Performed by: NURSE PRACTITIONER

## 2020-01-07 PROCEDURE — 25010000002 CEFTRIAXONE SODIUM-DEXTROSE 2-2.22 GM-%(50ML) RECONSTITUTED SOLUTION: Performed by: NURSE PRACTITIONER

## 2020-01-07 PROCEDURE — 99233 SBSQ HOSP IP/OBS HIGH 50: CPT | Performed by: NURSE PRACTITIONER

## 2020-01-07 PROCEDURE — 25010000002 PERFLUTREN (DEFINITY) 8.476 MG IN SODIUM CHLORIDE 0.9 % 10 ML INJECTION: Performed by: HOSPITALIST

## 2020-01-07 PROCEDURE — 0097U HC BIOFIRE FILMARRAY GI PANEL: CPT | Performed by: NURSE PRACTITIONER

## 2020-01-07 PROCEDURE — 93306 TTE W/DOPPLER COMPLETE: CPT

## 2020-01-07 PROCEDURE — 25010000002 VANCOMYCIN PER 500 MG: Performed by: NURSE PRACTITIONER

## 2020-01-07 PROCEDURE — 82150 ASSAY OF AMYLASE: CPT | Performed by: NURSE PRACTITIONER

## 2020-01-07 PROCEDURE — 25010000002 MAGNESIUM SULFATE IN D5W 1G/100ML (PREMIX) 1-5 GM/100ML-% SOLUTION: Performed by: NURSE PRACTITIONER

## 2020-01-07 PROCEDURE — 80048 BASIC METABOLIC PNL TOTAL CA: CPT | Performed by: NURSE PRACTITIONER

## 2020-01-07 PROCEDURE — 25010000002 ONDANSETRON PER 1 MG: Performed by: NURSE PRACTITIONER

## 2020-01-07 PROCEDURE — 25010000002 VANCOMYCIN 750 MG RECONSTITUTED SOLUTION 1 EACH VIAL: Performed by: NURSE PRACTITIONER

## 2020-01-07 PROCEDURE — 93306 TTE W/DOPPLER COMPLETE: CPT | Performed by: INTERNAL MEDICINE

## 2020-01-07 RX ORDER — VANCOMYCIN HYDROCHLORIDE 125 MG/1
CAPSULE ORAL
Status: COMPLETED
Start: 2020-01-07 | End: 2020-01-07

## 2020-01-07 RX ORDER — SCOLOPAMINE TRANSDERMAL SYSTEM 1 MG/1
1 PATCH, EXTENDED RELEASE TRANSDERMAL
Status: DISCONTINUED | OUTPATIENT
Start: 2020-01-07 | End: 2020-01-09

## 2020-01-07 RX ORDER — LOPERAMIDE HYDROCHLORIDE 2 MG/1
2 CAPSULE ORAL 4 TIMES DAILY PRN
Status: DISCONTINUED | OUTPATIENT
Start: 2020-01-07 | End: 2020-01-10 | Stop reason: HOSPADM

## 2020-01-07 RX ORDER — CARBAMAZEPINE 100 MG/1
200 TABLET, EXTENDED RELEASE ORAL EVERY 12 HOURS SCHEDULED
Status: DISCONTINUED | OUTPATIENT
Start: 2020-01-07 | End: 2020-01-09

## 2020-01-07 RX ORDER — VANCOMYCIN HYDROCHLORIDE 125 MG/1
125 CAPSULE ORAL 4 TIMES DAILY
Status: COMPLETED | OUTPATIENT
Start: 2020-01-07 | End: 2020-01-10

## 2020-01-07 RX ORDER — METHADONE HYDROCHLORIDE 5 MG/1
2.5 TABLET ORAL EVERY 8 HOURS SCHEDULED
Status: DISCONTINUED | OUTPATIENT
Start: 2020-01-07 | End: 2020-01-07

## 2020-01-07 RX ORDER — ALPRAZOLAM 0.25 MG/1
0.5 TABLET ORAL 4 TIMES DAILY
Status: DISCONTINUED | OUTPATIENT
Start: 2020-01-07 | End: 2020-01-10 | Stop reason: HOSPADM

## 2020-01-07 RX ORDER — METHADONE HYDROCHLORIDE 10 MG/1
10 TABLET ORAL EVERY 8 HOURS SCHEDULED
Status: DISCONTINUED | OUTPATIENT
Start: 2020-01-07 | End: 2020-01-10

## 2020-01-07 RX ORDER — PANTOPRAZOLE SODIUM 40 MG/10ML
40 INJECTION, POWDER, LYOPHILIZED, FOR SOLUTION INTRAVENOUS
Status: DISCONTINUED | OUTPATIENT
Start: 2020-01-08 | End: 2020-01-09

## 2020-01-07 RX ADMIN — CARBAMAZEPINE 200 MG: 100 TABLET, EXTENDED RELEASE ORAL at 08:53

## 2020-01-07 RX ADMIN — ALPRAZOLAM 0.5 MG: 0.25 TABLET ORAL at 17:51

## 2020-01-07 RX ADMIN — PROMETHAZINE HYDROCHLORIDE 12.5 MG: 25 INJECTION INTRAMUSCULAR; INTRAVENOUS at 09:51

## 2020-01-07 RX ADMIN — VANCOMYCIN HYDROCHLORIDE 125 MG: 125 CAPSULE ORAL at 22:04

## 2020-01-07 RX ADMIN — SCOPALAMINE 1 PATCH: 1 PATCH, EXTENDED RELEASE TRANSDERMAL at 12:05

## 2020-01-07 RX ADMIN — FAMOTIDINE 40 MG: 20 TABLET, FILM COATED ORAL at 08:55

## 2020-01-07 RX ADMIN — ACETAMINOPHEN 1000 MG: 500 TABLET, FILM COATED ORAL at 04:28

## 2020-01-07 RX ADMIN — ONDANSETRON 4 MG: 2 INJECTION, SOLUTION INTRAMUSCULAR; INTRAVENOUS at 04:28

## 2020-01-07 RX ADMIN — SODIUM CHLORIDE 100 ML/HR: 9 INJECTION, SOLUTION INTRAVENOUS at 15:55

## 2020-01-07 RX ADMIN — ALPRAZOLAM 0.5 MG: 0.25 TABLET ORAL at 22:03

## 2020-01-07 RX ADMIN — VANCOMYCIN HYDROCHLORIDE 1250 MG: 750 INJECTION, POWDER, LYOPHILIZED, FOR SOLUTION INTRAVENOUS at 03:07

## 2020-01-07 RX ADMIN — ONDANSETRON HYDROCHLORIDE 4 MG: 4 TABLET, FILM COATED ORAL at 11:17

## 2020-01-07 RX ADMIN — SODIUM CHLORIDE 100 ML/HR: 9 INJECTION, SOLUTION INTRAVENOUS at 03:08

## 2020-01-07 RX ADMIN — MAGNESIUM SULFATE HEPTAHYDRATE 1 G: 1 INJECTION, SOLUTION INTRAVENOUS at 11:02

## 2020-01-07 RX ADMIN — MELATONIN TAB 5 MG 10 MG: 5 TAB at 22:00

## 2020-01-07 RX ADMIN — PERFLUTREN 1 ML: 6.52 INJECTION, SUSPENSION INTRAVENOUS at 07:30

## 2020-01-07 RX ADMIN — ALPRAZOLAM 0.5 MG: 0.25 TABLET ORAL at 11:17

## 2020-01-07 RX ADMIN — MAGNESIUM SULFATE HEPTAHYDRATE 1 G: 1 INJECTION, SOLUTION INTRAVENOUS at 09:47

## 2020-01-07 RX ADMIN — POTASSIUM CHLORIDE 40 MEQ: 1500 TABLET, EXTENDED RELEASE ORAL at 11:23

## 2020-01-07 RX ADMIN — METHADONE HYDROCHLORIDE 10 MG: 10 TABLET ORAL at 22:00

## 2020-01-07 RX ADMIN — POTASSIUM CHLORIDE 40 MEQ: 1500 TABLET, EXTENDED RELEASE ORAL at 03:07

## 2020-01-07 RX ADMIN — MAGNESIUM SULFATE HEPTAHYDRATE 1 G: 1 INJECTION, SOLUTION INTRAVENOUS at 12:19

## 2020-01-07 RX ADMIN — METHADONE HYDROCHLORIDE 2.5 MG: 5 TABLET ORAL at 13:50

## 2020-01-07 RX ADMIN — CARBAMAZEPINE 200 MG: 100 TABLET, EXTENDED RELEASE ORAL at 22:16

## 2020-01-07 RX ADMIN — ACETAMINOPHEN 1000 MG: 500 TABLET, FILM COATED ORAL at 17:56

## 2020-01-07 RX ADMIN — CEFTRIAXONE 2 G: 2 INJECTION, SOLUTION INTRAVENOUS at 09:08

## 2020-01-07 NOTE — NURSING NOTE
Spoke with [place] r/t patient out of state Medicaid coverage. She states Financial counselor will follow up with patient.

## 2020-01-07 NOTE — PROGRESS NOTES
Contacted by staff regarding patient reporting methadone dose was 30 mg prior to [place], change to 10 mg 3 times daily

## 2020-01-07 NOTE — PROGRESS NOTES
"Adult Nutrition  Assessment/PES    Patient Name:  Talia Root  YOB: 1990  MRN: [id number]  Admit Date:  1/6/2020    Assessment Date:  1/7/2020    Comments: Encourage po as tolerated. Will cont to follow.    Reason for Assessment     Row Name 01/07/20 1345          Reason for Assessment    Reason For Assessment  identified at risk by screening criteria     Diagnosis  substance use/abuse;renal disease Sepsis r/t pyelonephritis, Substance Withdrawl + 2 ( meth, opiate) , NIYAH, + multiple stools         Nutrition/Diet History     Row Name 01/07/20 1346          Nutrition/Diet History    Typical Food/Fluid Intake  Pt resting in dark room. Noted diet changed to clears b/c severe nausea & reports cannot eat at this time         Anthropometrics     Row Name 01/07/20 1346 01/07/20 0943       Anthropometrics    Height  --  157.5 cm (62\")    Weight  -- 78 kg   78 kg (172 lb)       Ideal Body Weight (IBW)    Ideal Body Weight (IBW) (kg)  --  50.43    % Ideal Body Weight  --  154.7       Body Mass Index (BMI)    BMI (kg/m2)  --  31.52    BMI Assessment  BMI 30-34.9: obesity grade I  --        Labs/Tests/Procedures/Meds     Row Name 01/07/20 1348          Labs/Procedures/Meds    Lab Results Reviewed  reviewed     Lab Results Comments  glu 122, 132        Diagnostic Tests/Procedures    Diagnostic Test/Procedure Reviewed  reviewed     Diagnostic Test/Procedures Comments  abd ct, + meth, + benzo        Medications    Pertinent Medications Reviewed  reviewed     Pertinent Medications Comments  melatonin, kcl           Estimated/Assessed Needs     Row Name 01/07/20 1349 01/07/20 0943       Calculation Measurements    Height  --  157.5 cm (62\")       Estimated/Assessed Needs    Additional Documentation  Calorie Requirements (Group);Protein Requirements (Group);Fluid Requirements (Group)  --       Calorie Requirements    Estimated Calorie Need Method  [facility]  --    Estimated Calorie Requirement Comment  1758 kcal " ( mifflin 1.2 )   --       Protein Requirements    Est Protein Requirement Amount (gms/kg)  1.0 gm protein 78 gm pro  --       Fluid Requirements    Estimated Fluid Requirement Method  RDA Method 1758 ml  --        Nutrition Prescription Ordered     Row Name 01/07/20 1350          Nutrition Prescription PO    Current PO Diet  Clear Liquid         Evaluation of Received Nutrient/Fluid Intake     Row Name 01/07/20 1350          Fluid Intake Evaluation    Oral Fluid (mL)  480 insufficient data        PO Evaluation    Number of Days PO Intake Evaluated  Insufficient Data               Problem/Interventions:  Problem 1     Row Name 01/07/20 1351          Nutrition Diagnoses Problem 1    Problem 1  Inadequate Intake/Infusion     Inadequate Intake Type  Oral     Etiology (related to)  Factors Affecting Nutrition;Medical Diagnosis     Signs/Symptoms (evidenced by)  Report/Observation               Intervention Goal     Row Name 01/07/20 1352          Intervention Goal    PO  Establish PO;Tolerate PO;PO intake (%)     PO Intake %  50 %     Weight  No significant weight loss         Nutrition Intervention     Row Name 01/07/20 1352          Nutrition Intervention    RD/Tech Action  Follow Tx progress         Nutrition Prescription     Row Name 01/07/20 1353          Other Orders    Other  Advance diet as indicated         Education/Evaluation     Row Name 01/07/20 1353          Education    Education  Education not appropriate at this time        Monitor/Evaluation    Monitor  Per protocol;I&O;PO intake;Pertinent labs;Weight;Symptoms           Electronically signed by:  Brittany Katz RD  01/07/20 1:54 PM

## 2020-01-07 NOTE — NURSING NOTE
Discharge Planning Assessment  BONITA Cole     Patient Name: Talia Root  MRN: [id number]  Today's Date: 1/7/2020    Admit Date: 1/6/2020    Discharge Needs Assessment     Row Name 01/07/20 1446       Living Environment    Lives With  grandparent(s)    Current Living Arrangements  home/apartment/condo    Primary Care Provided by  self    Provides Primary Care For  child(silvia)    Family Caregiver if Needed  grandparent(s)    Quality of Family Relationships  unable to assess    Able to Return to Prior Arrangements  yes       Resource/Environmental Concerns    Resource/Environmental Concerns  none    Transportation Concerns  car, none       Transition Planning    Patient/Family Anticipates Transition to  home with family    Patient/Family Anticipated Services at Transition  none    Transportation Anticipated  family or friend will provide       Discharge Needs Assessment    Readmission Within the Last 30 Days  no previous admission in last 30 days    Equipment Currently Used at Home  none        Discharge Plan     Row Name 01/07/20 1448       Plan    Plan  plan home with family, assess needs    Patient/Family in Agreement with Plan  yes    Plan Comments  Spoke with patient at bedside. Face sheet verified - this is her address in [facility]. She states she is currently staying at her grandparents home. She did not know the street address, but states the mailing address is [place] [phone]. She states her grandparents, herself and her 5yr old son are currently at the residence. She denies use of DME, home 02, cpap/bipap. She has not used HH services previously. She states she has done rehab with [facility] for 5 years. She says she last took methadone a few days before [place]. She does not have a local PCP. She requests any new Rx be sent to [facility] (chart updated). HUNTER # placed on white board, will continue to follow.        Destination      Coordination has not been started for this  encounter.      Durable Medical Equipment      Coordination has not been started for this encounter.      Dialysis/Infusion      Coordination has not been started for this encounter.      Home Medical Care      Coordination has not been started for this encounter.      Therapy      Coordination has not been started for this encounter.      Community Resources      Coordination has not been started for this encounter.          Demographic Summary     Row Name 01/07/20 1444       General Information    Admission Type  inpatient    Arrived From  home    Referral Source  admission list    Reason for Consult  discharge planning    Preferred Language  English     Used During This Interaction  no       Contact Information    Permission Granted to Share Info With          Functional Status    No documentation.       Psychosocial    No documentation.       Abuse/Neglect    No documentation.       Legal    No documentation.       Substance Abuse    No documentation.       Patient Forms    No documentation.           Aniceto Chang RN

## 2020-01-07 NOTE — PAYOR COMM NOTE
"Talia Sepulveda (29 y.o. Female)   ATTN: [facility] (FAX# [phone])  REF: [id number]  RE: CLINICALS FOR INPATIENT AUTHORIZATION     Alyssia Shirley  Utilization Review/Room Reservations  Phone:[phone] ,Fransisca/[phone], [phone], [phone] or [phone]  Fax: [phone]  Email: [email or web address]  Please call, fax back, or email with authorization or any questions! Thanks!    Date of Birth Social Security Number Address Home Phone MRN    1990  [address] [address]  [id number]    Worship Marital Status          Unknown Unknown       Admission Date Admission Type Admitting Provider Attending Provider Department, Room/Bed    1/6/20 Emergency Karissa Hoffman DO Ringswald, Madonna, DO [facility] MED SURG, 1408/1    Discharge Date Discharge Disposition Discharge Destination                       Attending Provider:  Karissa Hoffman DO    Allergies:  Trazodone    Isolation:  Spore   Infection:  None   Code Status:  CPR    Ht:  157.5 cm (62\")   Wt:  78 kg (172 lb)    Admission Cmt:  None   Principal Problem:  None                Active Insurance as of 1/6/2020     Primary Coverage     Payor Plan Insurance Group Employer/Plan Group    MEDICAID OUT OF STATE MISC MEDICAID OUT OF STATE      Coverage Address Coverage Phone Number Coverage Fax Number Effective Dates    [address] [phone]  1/1/2020 - None Entered    [place] [id number]       Subscriber Name Subscriber Birth Date Member ID       TALIA SEPULVEDA 1990 [id number]                 Emergency Contacts      (Rel.) Home Phone Work Phone Mobile Phone    Cara Lindo (Grandparent) -- -- [phone]               History & Physical      Miranda Lozano, APREFE at 01/06/20 1411     Attestation signed by Karissa Hoffman DO at 01/06/20 1707    Review note and saw patient and agree with nurse practitioner.            " "        [facility] HOSPITALIST     Provider, No Known    CHIEF COMPLAINT: n/v/abdominal pain    HISTORY OF PRESENT ILLNESS:  The patient is a 29-year-old female that presented to the emergency department secondary to 6 days of fever, chills, nausea, vomiting, abdominal pain, back pain, dysuria.  She notes she moved here from [place] 6 days ago.  She reports she has a past history of IV drug abuse with heroin approximately 5 years ago.  She has been on methadone and Xanax last doses before [facility].      She reports a history of skin abscesses in past, depression, IVDA.  She reports she is not sexually active, LMP approximately 2 weeks ago, no history of STDs.    She otherwise denies headache/rhinorrhea/nasal congestion/lightheadedness/syncopal sensation/cough/soa/diarrhea/chest pain/recent illness/sick exposures/change in bowel or bladder habits/no weight change/bloody emesis or bloody stools/change in medications or any other new concerns.    Past Medical History:   Diagnosis Date   • Addiction to drug (CMS/[facility])    • Depression      History reviewed. No pertinent surgical history.  History reviewed. No pertinent family history.  Social History     Tobacco Use   • Smoking status: Current Every Day Smoker     Packs/day: 1.00   Substance Use Topics   • Alcohol use: Not Currently   • Drug use: Not Currently       (Not in a hospital admission)  Allergies:  Patient has no known allergies.      There is no immunization history on file for this patient.    REVIEW OF SYSTEMS:  Please see the above history of present illness for pertinent positives and negatives.  The remainder of the patient's systems have been reviewed and are negative.    Vital Signs  Temp:  [99.3 °F (37.4 °C)-100.1 °F (37.8 °C)] 100.1 °F (37.8 °C)  Heart Rate:  [108-128] 118  Resp:  [16-18] 16  BP: (104-146)/() 130/79   Body mass index is 30.11 kg/m².    Flowsheet Rows      First Filed Value   Admission Height  160 cm (63\") " Documented at 01/06/2020 0746   Admission Weight  77.1 kg (170 lb) Documented at 01/06/2020 0746           Physical Exam   Constitutional: She is oriented to person, place, and time. She appears well-developed and well-nourished.   HENT:   Head: Normocephalic and atraumatic.   Eyes: Pupils are equal, round, and reactive to light. EOM are normal.   Pupils bilaterally 5 mm   Cardiovascular:   Tachycardic, regular   Pulmonary/Chest: Effort normal and breath sounds normal. No stridor. No respiratory distress. She has no wheezes.   Abdominal: Soft. Bowel sounds are normal. She exhibits no distension. There is no tenderness. There is no guarding.   Musculoskeletal: She exhibits no edema.   Neurological: She is alert and oriented to person, place, and time.   Skin: Skin is warm and dry. No erythema.   Extensive tattooing of skin noted   Psychiatric: She has a normal mood and affect. Her behavior is normal.   Vitals reviewed.    Emotional Behavior:    Judgement and Insight: Good   Mental Status:  Alertness alert   Memory: Good   Mood and Affect:         Depression anxious               Anxiety anxious    Debilities:   Physical Weakness none   Handicaps none   Disabilities none   Agitation mild     Results Review:    I reviewed the patient's new clinical results.  Lab Results (most recent)     Procedure Component Value Units Date/Time    Comprehensive Metabolic Panel [[id number]]  (Abnormal) Collected:  01/06/20 1111    Specimen:  Blood Updated:  01/06/20 1141     Glucose 132 mg/dL      BUN 10 mg/dL      Creatinine 1.01 mg/dL      Sodium 130 mmol/L      Potassium 2.6 mmol/L      Chloride 89 mmol/L      CO2 23.3 mmol/L      Calcium 7.8 mg/dL      Total Protein 6.8 g/dL      Albumin 3.10 g/dL      ALT (SGPT) 20 U/L      AST (SGOT) 16 U/L      Alkaline Phosphatase 87 U/L      Total Bilirubin 0.6 mg/dL      eGFR Non African Amer 65 mL/min/1.73      Globulin 3.7 gm/dL      A/G Ratio 0.8 g/dL      BUN/Creatinine Ratio 9.9     Anion  Gap 17.7 mmol/L     Narrative:       GFR Normal >60  Chronic Kidney Disease <60  Kidney Failure <15      Lipase [[id number]]  (Abnormal) Collected:  01/06/20 1111    Specimen:  Blood Updated:  01/06/20 1137     Lipase 6 U/L     Ethanol [[id number]] Collected:  01/06/20 1111    Specimen:  Blood Updated:  01/06/20 1137     Ethanol <10 mg/dL      Ethanol % <0.010 %     Urine Culture - Urine, Urine, Clean Catch [[id number]] Collected:  01/06/20 0833    Specimen:  Urine, Clean Catch Updated:  01/06/20 1119    CBC & Differential [[id number]] Collected:  01/06/20 1111    Specimen:  Blood Updated:  01/06/20 1117    Narrative:       The following orders were created for panel order CBC & Differential.  Procedure                               Abnormality         Status                     ---------                               -----------         ------                     CBC Auto Differential[[id number]]        Abnormal            Final result                 Please view results for these tests on the individual orders.    CBC Auto Differential [[id number]]  (Abnormal) Collected:  01/06/20 1111    Specimen:  Blood Updated:  01/06/20 1117     WBC 16.39 10*3/mm3      RBC 3.89 10*6/mm3      Hemoglobin 11.3 g/dL      Hematocrit 34.0 %      MCV 87.4 fL      MCH 29.0 pg      MCHC 33.2 g/dL      RDW 15.0 %      RDW-SD 48.9 fl      MPV 11.4 fL      Platelets 133 10*3/mm3      Neutrophil % 87.4 %      Lymphocyte % 3.1 %      Monocyte % 8.6 %      Eosinophil % 0.0 %      Basophil % 0.1 %      Immature Grans % 0.8 %      Neutrophils, Absolute 14.33 10*3/mm3      Lymphocytes, Absolute 0.51 10*3/mm3      Monocytes, Absolute 1.41 10*3/mm3      Eosinophils, Absolute 0.00 10*3/mm3      Basophils, Absolute 0.01 10*3/mm3      Immature Grans, Absolute 0.13 10*3/mm3     Urinalysis, Microscopic Only - Urine, Clean Catch [[id number]]  (Abnormal) Collected:  01/06/20 0833    Specimen:  Urine, Clean Catch Updated:  01/06/20 0942     RBC, UA 13-20 /HPF        WBC, UA 6-12 /HPF      Bacteria, UA 2+ /HPF      Squamous Epithelial Cells, UA 0-2 /HPF      Hyaline Casts, UA None Seen /LPF      Methodology Manual Light Microscopy    Urine Drug Screen - [[id number]]  (Abnormal) Collected:  01/06/20 0833    Specimen:  Urine Updated:  01/06/20 0930     THC, Screen, Urine Negative     Phencyclidine (PCP), Urine Negative     Cocaine Screen, Urine Negative     Methamphetamine, Ur Negative     Opiate Screen Negative     Amphetamine Screen, Urine Negative     Benzodiazepine Screen, Urine Positive     Tricyclic Antidepressants Screen Negative     Methadone Screen, Urine Positive     Barbiturates Screen, Urine Negative     Oxycodone Screen, Urine Negative     Propoxyphene Screen Negative     Buprenorphine, Screen, Urine Negative    Narrative:       Urine drug screen results are to be used for medical purposes only.  They are not to be used for legal purposes such as employment testing.  Negative results do not necessarily mean the complete absence of a subtance, but rather that the result is less than the cutoff for that substance.  Positive results are unconfirmed and considered Preliminary Positive.  [facility] does not automatically confirm Postitive Unconfirmed results.  The physician may request (order) an Unconfirmed Positive result to be sent out for confirmation.      Negative Thresholds for Drugs Screened:    THC screen, urine                          50 ng/ml  Phenycyclidine (PCP), urine                25 ng/ml  Cocaine screen, urine                     150 ng/ml  Methamphetamine, urine                    500 ng/ml  Opiate screen, urine                      100 ng/ml  Amphetamine screen, urine                 500 ng/ml  Benzodiazepine screen, urine              150 ng/ml  Tricyclic Antidepressants screen, urine   300 ng/ml  Methadone screen, urine                   200 ng/ml  Barbiturates screen, urine                200 ng/ml  Oxycodone screen, urine                    100 ng/ml  Propoxyphene screen, urine                300 ng/ml  Buprenorphine screen, urine                10 ng/ml    Pregnancy, Urine - Urine, Clean Catch [[id number]]  (Normal) Collected:  01/06/20 0833    Specimen:  Urine, Clean Catch Updated:  01/06/20 0924     HCG, Urine QL Negative    Urinalysis With Microscopic If Indicated (No Culture) - Urine, Clean Catch [[id number]]  (Abnormal) Collected:  01/06/20 0833    Specimen:  Urine, Clean Catch Updated:  01/06/20 0922     Color, UA Yellow     Appearance, UA Clear     pH, UA 6.0     Specific Gravity, UA 1.010     Glucose, UA Negative     Ketones, UA Negative     Bilirubin, UA Negative     Blood, UA Moderate (2+)     Protein,  mg/dL (2+)     Leuk Esterase, UA Small (1+)     Nitrite, UA Positive     Urobilinogen, UA 0.2 E.U./dL    Rapid Strep A Screen - Swab, Throat [[id number]]  (Normal) Collected:  01/06/20 0833    Specimen:  Swab from Throat Updated:  01/06/20 0922     Strep A Ag Negative    Beta Strep Culture, Throat - Swab, Throat [[id number]] Collected:  01/06/20 0833    Specimen:  Swab from Throat Updated:  01/06/20 0922    Influenza Antigen, Rapid - Swab, Nasopharynx [[id number]]  (Normal) Collected:  01/06/20 0833    Specimen:  Swab from Nasopharynx Updated:  01/06/20 0922     Influenza A Ag, EIA Negative     Influenza B Ag, EIA Negative          Imaging Results (Most Recent)     Procedure Component Value Units Date/Time    CT Abdomen Pelvis Without Contrast [[id number]] Collected:  01/06/20 1352     Updated:  01/06/20 1406    Narrative:       CT abdomen and pelvis without contrast   01/06/2020     HISTORY:  Generalized abdomen pain and vomiting for 6 days     COMPARISON:  NONE     TECHNIQUE:    CT of Abdomen and Pelvis without contrast performed.  Sagittal and  Coronal reconstructions performed. Radiation dose reduction techniques  included automated exposure control or exposure modulation based on body  size. Radiation audit for CT and nuclear  cardiology exams in the last 12  months: 0.      FINDINGS:    Abdomen:  Lung bases are clear. Liver slightly enlarged. There are no focal  lesions. The gallbladder, spleen, pancreas, and adrenal glands are  normal.. The left kidney appears normal. The right kidney may be  slightly swollen with effacement of the fatty tissue around the renal  pelvis. No definite stranding is noted around the kidney. There are no  stones. The aorta is normal in size. The bowel is normal.       Pelvis:  Uterus and right ovary appear normal. Left ovary has a small cyst  measuring 3 cm in diameter. Bones are unremarkable.         Impression:       There is a subtle findings that may represent edema in the  right kidney. Correlation with urinalysis recommended to check for  possible pyelonephritis.. No stones are identified. There is no evidence  of bowel obstruction     Otherwise study is normal except for mild prominence of the liver     This report was finalized on 1/6/2020 2:00 PM by Dr. Td Lindo MD.           reviewed    ECG/EMG Results (most recent)     None        Pending    Assessment/Plan   Sepsis 2/2 acute right pyelonephritis: (WBCC, heart rate, source)  H/O skin abscesses:  Check lactic acid, procalcitonin, urine C&S, sed rate, crp  Rocephin 2 g IV daily, add vancomycin pharmacy to dose to cover for MRSA  IV hydration  Monitor    Acute benzodiazepine withdrawal:  Give xanax 0.5 mg now then every 6 hours as needed  CIWA protocol  Seizure precautions    Tachycardia:  Likely 2/2 all above however rule out SBE with history of IV drug abuse    Electrolyte imbalance:  Electrolyte replacement protocol added    Hyponatremia: Likely 2/2 volume depletion  Continue IV hydration, recheck in a.m.    UDS positive for methadone and benzodiazepines  Patient denies use in over 2 weeks    I discussed the patients findings and my recommendations with patient.     Miranda Lozano, APRN  01/06/20  3:13 PM            Electronically signed by  Karissa Hoffman,  at 01/06/20 1709       Vital Signs (last day)     Date/Time   Temp   Temp src   Pulse   Resp   BP   Patient Position   SpO2    01/07/20 1121   98.3 (36.8)   Oral   104   16   131/95   Lying   100    01/07/20 0958   98.5 (36.9)   Oral   --   --   --   --   --    01/07/20 0943   --   --   --   --   108/68   --   --    01/07/20 0616   99.1 (37.3)   Oral   94   16   108/68   Lying   94    01/07/20 0407   (!) 101.2 (38.4)   Oral   --   --   --   --   --    01/07/20 0324   (!) 102.4 (39.1)   Oral   111   16   109/69   Lying   96    01/06/20 1925   (!) 100.9 (38.3)   Oral   112   20   119/65   Lying   97    01/06/20 1704   --   --   92   --   --   --   96    01/06/20 1633   (!) 100.7 (38.2)   Oral   113   16   111/69   Lying   95    01/06/20 1545   --   --   109   --   121/87   --   --    01/06/20 1530   --   --   115   --   128/64   --   95    01/06/20 1525   --   --   120   --   --   --   97    01/06/20 1445   --   --   (!) 122   --   111/68   --   98    01/06/20 1430   --   --   --   --   141/83   --   95    01/06/20 1422   --   --   118   --   --   --   99    01/06/20 1415   --   --   --   --   130/79   --   100    01/06/20 1400   --   --   (!) 125   --   104/88   --   100    01/06/20 1349   --   --   (!) 121   --   --   --   98    01/06/20 1345   --   --   --   --   (!) 146/117   --   99    01/06/20 1311   100.1 (37.8)   Oral   (!) 123   16   131/75   Lying   97    01/06/20 1245   --   --   --   --   122/93   --   97    01/06/20 1243   --   --   (!) 125   --   --   --   99    01/06/20 1230   --   --   (!) 121   --   127/76   --   97    01/06/20 1143   --   --   (!) 124   --   --   --   100    01/06/20 1130   --   --   (!) 121   --   140/88   --   98    01/06/20 1124   --   --   (!) 128   --   --   --   100    01/06/20 1000   --   --   --   --   136/91   --   100    01/06/20 0946   100 (37.8)   Oral   117   --   --   --   99 01/06/20 0945   --   --   --   --   140/83   --   99 01/06/20 0845    --   --   108   --   113/78   --   99    01/06/20 0842   --   --   --   --   --   --   99    01/06/20 0841   --   --   --   --   121/73   --   --    01/06/20 0746   99.3 (37.4)   --   (!) 127   18   122/89   --   97              Lines, Drains & Airways    Active LDAs     Name:   Placement date:   Placement time:   Site:   Days:    Peripheral IV 01/07/20 0511 Left Antecubital   01/07/20    0511    Antecubital   less than 1         Inactive LDAs     Name:   Placement date:   Placement time:   Removal date:   Removal time:   Site:   Days:    [REMOVED] Peripheral IV 01/06/20 1145 External Jugular   01/06/20    1145    01/07/20    0407    External Jugular   less than 1                  Facility-Administered Medications as of 1/7/2020   Medication Dose Route Frequency Provider Last Rate Last Dose   • [COMPLETED] acetaminophen (TYLENOL) tablet 1,000 mg  1,000 mg Oral Once Arnol Oliver MD   1,000 mg at 01/06/20 1423   • acetaminophen (TYLENOL) tablet 1,000 mg  1,000 mg Oral Q6H PRN Miranda Lozano APRN   1,000 mg at 01/07/20 0428   • [COMPLETED] ALPRAZolam (XANAX) tablet 0.5 mg  0.5 mg Oral Once Miranda Lozano APRN   0.5 mg at 01/06/20 1518   • ALPRAZolam (XANAX) tablet 0.5 mg  0.5 mg Oral 4x Daily Miranda Lozano APRN       • bisacodyl (DULCOLAX) EC tablet 5 mg  5 mg Oral Daily PRN Miranda Lozano APRN       • bisacodyl (DULCOLAX) suppository 10 mg  10 mg Rectal Daily PRN Miranda Lozano APRN       • [COMPLETED] carBAMazepine (TEGretol) 200 MG tablet  - ADS Override Pull        200 mg at 01/06/20 2151   • carBAMazepine XR (TEGretol  XR) 12 hr tablet 200 mg  200 mg Oral Q12H Collette Leiva MD   200 mg at 01/07/20 0853   • [COMPLETED] cefTRIAXone (ROCEPHIN) IVPB 1 g/50ml dextrose (premix)  1 g Intravenous Once Arnol Oliver MD   Stopped at 01/06/20 1531   • cefTRIAXone (ROCEPHIN) IVPB 2 g/50ml dextrose (premix)  2 g Intravenous Q24H Miranda Lozano APRN 100 mL/hr at 01/07/20 0908  2 g at 01/07/20 0908   • [COMPLETED] dicyclomine (BENTYL) injection 20 mg  20 mg Intramuscular Once Arnol Oliver MD   20 mg at 01/06/20 1004   • famotidine (PEPCID) tablet 40 mg  40 mg Oral Daily Miranda Lozano APRN   40 mg at 01/07/20 0855   • FLUoxetine (PROzac) capsule 20 mg  20 mg Oral Daily Collette Leiva MD   20 mg at 01/06/20 2151   • loperamide (IMODIUM) capsule 2 mg  2 mg Oral 4x Daily PRN Miranda Lozano APRN       • [COMPLETED] LORazepam (ATIVAN) injection 1 mg  1 mg Intramuscular Once Arnol Oliver MD   1 mg at 01/06/20 0829   • magnesium hydroxide (MILK OF MAGNESIA) 400 MG/5ML suspension 30 mL  30 mL Oral Daily PRN Miranda Lozano APRN       • magnesium sulfate 4 gram infusion - Mg less than or equal to 1mg/dL  4 g Intravenous PRN Miranda Lozano APRN        Or   • magnesium sulfate 3 gram infusion (1gm x 3) - Mg 1.1 - 1.5 mg/dL  1 g Intravenous PRN Miranda Lozano APRN 100 mL/hr at 01/07/20 1102 1 g at 01/07/20 1102    Or   • Magnesium Sulfate 2 gram infusion- Mg 1.6 - 1.9 mg/dL  2 g Intravenous PRN Miranda Lozano APRN       • melatonin tablet 10 mg  10 mg Oral Nightly Collette Leiva MD   10 mg at 01/06/20 2152   • melatonin tablet 5 mg  5 mg Oral Nightly PRN Miranda Lozano APRN       • methadone (DOLOPHINE) tablet 2.5 mg  2.5 mg Oral Q8H Miranda Lozano APRN       • [COMPLETED] ondansetron (ZOFRAN) injection 4 mg  4 mg Intramuscular Once Arnol Oliver MD   4 mg at 01/06/20 0829   • ondansetron (ZOFRAN) tablet 4 mg  4 mg Oral Q6H PRN Miranda Lozano APRN   4 mg at 01/07/20 1117   • [COMPLETED] perflutren (DEFINITY) 8.476 mg in sodium chloride 0.9 % 10 mL injection  1 mL Intravenous Once Karissa Hoffman,    1 mL at 01/07/20 0730   • Pharmacy to dose vancomycin   Does not apply Continuous PRN Miranda Lozano APRN       • potassium chloride (K-DUR,KLOR-CON) CR tablet 40 mEq  40 mEq Oral PRN Miranda Lozano APRN   40 mEq at  20 1123    Or   • potassium chloride (KLOR-CON) packet 40 mEq  40 mEq Oral PRN Miranda Lozano APRN        Or   • potassium chloride 10 mEq in 100 mL IVPB  10 mEq Intravenous Q1H PRN Miranda Lozano APRN       • [] potassium chloride (KLOR-CON) 20 MEQ packet  - ADS Override Pull            • potassium chloride (KLOR-CON) packet 40 mEq  40 mEq Oral Once Arnol Oliver MD       • [COMPLETED] potassium chloride 10 mEq in 100 mL IVPB  10 mEq Intravenous Once Arnol Oliver MD   Stopped at 20 1333   • [COMPLETED] potassium chloride 10 mEq in 100 mL IVPB  10 mEq Intravenous Once Arnol Oliver MD   Stopped at 20 1408   • promethazine (PHENERGAN) IV syringe 12.5 mg  12.5 mg Intravenous Q6H PRN Miranda Lozano APREFE   12.5 mg at 20 0951   • Scopolamine (TRANSDERM-SCOP) 1.5 MG/3DAYS patch 1 patch  1 patch Transdermal Q72H Miranda Lozano APRN       • sodium chloride 0.9 % flush 10 mL  10 mL Intravenous PRN Miranda Lozano APRN       • sodium chloride 0.9 % flush 10 mL  10 mL Intravenous Q12H Miranda Lozano APRN   10 mL at 20 2154   • sodium chloride 0.9 % infusion 40 mL  40 mL Intravenous PRN Miranda Lozano APRN       • sodium chloride 0.9 % infusion  100 mL/hr Intravenous Continuous Miranda Lozano APRN   Stopped at 20 0433   • sodium chloride 0.9% - IBW for BMI > 30 bolus 1,572 mL  30 mL/kg (Ideal) Intravenous PRN Miranda Lozano APRN       • vancomycin (VANCOCIN) 1,250 mg in sodium chloride 0.9 % 250 mL IVPB  1,250 mg Intravenous Q12H Miranda Lozano APRN   1,250 mg at 20 0307     Blood Administration Record (From admission, onward)    None          Lab Results (last 24 hours)     Procedure Component Value Units Date/Time    Urine Culture - Urine, Urine, Clean Catch [[id number]]  (Abnormal) Collected:  20    Specimen:  Urine, Clean Catch Updated:  20     Urine Culture >100,000 CFU/mL Escherichia coli     Magnesium [[id number]]  (Abnormal) Collected:  01/07/20 0746    Specimen:  Blood Updated:  01/07/20 0757     Magnesium 1.3 mg/dL     Basic Metabolic Panel [[id number]]  (Abnormal) Collected:  01/07/20 0506    Specimen:  Blood Updated:  01/07/20 0616     Glucose 122 mg/dL      BUN 10 mg/dL      Creatinine 1.05 mg/dL      Sodium 135 mmol/L      Potassium 3.5 mmol/L      Chloride 99 mmol/L      CO2 22.0 mmol/L      Calcium 7.4 mg/dL      eGFR Non African Amer 62 mL/min/1.73      BUN/Creatinine Ratio 9.5     Anion Gap 14.0 mmol/L     Narrative:       GFR Normal >60  Chronic Kidney Disease <60  Kidney Failure <15      Lactic Acid, Plasma [[id number]]  (Normal) Collected:  01/07/20 0506    Specimen:  Blood Updated:  01/07/20 0612     Lactate 1.5 mmol/L     CBC & Differential [[id number]] Collected:  01/07/20 0506    Specimen:  Blood Updated:  01/07/20 0603    Narrative:       The following orders were created for panel order CBC & Differential.  Procedure                               Abnormality         Status                     ---------                               -----------         ------                     CBC Auto Differential[[id number]]        Abnormal            Final result                 Please view results for these tests on the individual orders.    CBC Auto Differential [[id number]]  (Abnormal) Collected:  01/07/20 0506    Specimen:  Blood Updated:  01/07/20 0603     WBC 12.34 10*3/mm3      RBC 3.41 10*6/mm3      Hemoglobin 10.1 g/dL      Hematocrit 30.2 %      MCV 88.6 fL      MCH 29.6 pg      MCHC 33.4 g/dL      RDW 15.6 %      RDW-SD 50.7 fl      MPV 11.3 fL      Platelets 116 10*3/mm3      Neutrophil % 79.6 %      Lymphocyte % 7.5 %      Monocyte % 12.2 %      Eosinophil % 0.1 %      Basophil % 0.1 %      Immature Grans % 0.5 %      Neutrophils, Absolute 9.83 10*3/mm3      Lymphocytes, Absolute 0.92 10*3/mm3      Monocytes, Absolute 1.51 10*3/mm3      Eosinophils, Absolute 0.01 10*3/mm3       Basophils, Absolute 0.01 10*3/mm3      Immature Grans, Absolute 0.06 10*3/mm3      nRBC 0.0 /100 WBC     Blood Culture - Blood, Arm, Right [[id number]] Collected:  01/06/20 2117    Specimen:  Blood from Arm, Right Updated:  01/06/20 2118    C-reactive Protein [[id number]]  (Abnormal) Collected:  01/06/20 1658    Specimen:  Blood Updated:  01/06/20 1957     C-Reactive Protein 29.41 mg/dL     Lactic Acid, Plasma [[id number]]  (Normal) Collected:  01/06/20 1658    Specimen:  Blood Updated:  01/06/20 1723     Lactate 0.9 mmol/L     Procalcitonin [[id number]]  (Abnormal) Collected:  01/06/20 1111    Specimen:  Blood Updated:  01/06/20 1708     Procalcitonin 5.17 ng/mL     TSH [[id number]]  (Normal) Collected:  01/06/20 1111    Specimen:  Blood Updated:  01/06/20 1704     TSH 0.318 uIU/mL     Blood Culture - Blood, Hand, Right [[id number]] Collected:  01/06/20 1702    Specimen:  Blood from Hand, Right Updated:  01/06/20 1702    Hemoglobin A1c [[id number]]  (Normal) Collected:  01/06/20 1111    Specimen:  Blood Updated:  01/06/20 1700     Hemoglobin A1C 5.00 %     Narrative:       Hemoglobin A1C Ranges:    Increased Risk for Diabetes  5.7% to 6.4%  Diabetes                     >= 6.5%  Diabetic Goal                < 7.0%    Sedimentation Rate [[id number]]  (Abnormal) Collected:  01/06/20 1111    Specimen:  Blood Updated:  01/06/20 1515     Sed Rate 41 mm/hr         Imaging Results (Last 24 Hours)     Procedure Component Value Units Date/Time    CT Abdomen Pelvis Without Contrast [[id number]] Collected:  01/06/20 1352     Updated:  01/06/20 1406    Narrative:       CT abdomen and pelvis without contrast   01/06/2020     HISTORY:  Generalized abdomen pain and vomiting for 6 days     COMPARISON:  NONE     TECHNIQUE:    CT of Abdomen and Pelvis without contrast performed.  Sagittal and  Coronal reconstructions performed. Radiation dose reduction techniques  included automated exposure control or exposure modulation based on body  size.  Radiation audit for CT and nuclear cardiology exams in the last 12  months: 0.      FINDINGS:    Abdomen:  Lung bases are clear. Liver slightly enlarged. There are no focal  lesions. The gallbladder, spleen, pancreas, and adrenal glands are  normal.. The left kidney appears normal. The right kidney may be  slightly swollen with effacement of the fatty tissue around the renal  pelvis. No definite stranding is noted around the kidney. There are no  stones. The aorta is normal in size. The bowel is normal.       Pelvis:  Uterus and right ovary appear normal. Left ovary has a small cyst  measuring 3 cm in diameter. Bones are unremarkable.         Impression:       There is a subtle findings that may represent edema in the  right kidney. Correlation with urinalysis recommended to check for  possible pyelonephritis.. No stones are identified. There is no evidence  of bowel obstruction     Otherwise study is normal except for mild prominence of the liver     This report was finalized on 1/6/2020 2:00 PM by Dr. Td Lindo MD.           ECG/EMG Results (last 24 hours)     Procedure Component Value Units Date/Time    Adult Transthoracic Echo Complete W/ Cont if Necessary Per Protocol [[id number]] Collected:  01/07/20 0717     Updated:  01/07/20 0947     BSA 1.8 m^2      IVSd 1.0 cm      LVIDd 4.3 cm      LVIDs 3.0 cm      LVPWd 1.0 cm      IVS/LVPW 1.0     FS 29.8 %      EDV(Teich) 84.4 ml      ESV(Teich) 36.2 ml      EF(Teich) 57.2 %      EDV(cubed) 81.2 ml      ESV(cubed) 28.1 ml      EF(cubed) 65.4 %      LV mass(C)d 146.1 grams      LV mass(C)dI 81.5 grams/m^2      SV(Teich) 48.3 ml      SI(Teich) 26.9 ml/m^2      SV(cubed) 53.1 ml      SI(cubed) 29.6 ml/m^2      Ao root diam 2.7 cm      Ao root area 5.7 cm^2      ACS 2.1 cm      asc Aorta Diam 2.7 cm      LVOT diam 2.0 cm      LVOT area 3.1 cm^2      LVOT area(traced) 3.1 cm^2      RVOT diam 1.7 cm      RVOT area 2.3 cm^2      LVLd ap4 8.4 cm      EDV(MOD-sp4) 117.0 ml       LVLs ap4 6.8 cm      ESV(MOD-sp4) 46.1 ml      EF(MOD-sp4) 60.6 %      LVLd ap2 8.4 cm      EDV(MOD-sp2) 129.0 ml      LVLs ap2 6.8 cm      ESV(MOD-sp2) 52.0 ml      EF(MOD-sp2) 59.7 %      SV(MOD-sp4) 70.9 ml      SI(MOD-sp4) 39.5 ml/m^2      SV(MOD-sp2) 77.0 ml      SI(MOD-sp2) 42.9 ml/m^2      Ao root area (BSA corrected) 1.5     LV Hansen Vol (BSA corrected) 65.3 ml/m^2      LV Sys Vol (BSA corrected) 25.7 ml/m^2      TAPSE (>1.6) 2.1 cm      MV A dur 0.13 sec      MV E max moraima 97.5 cm/sec      MV A max moraima 48.5 cm/sec      MV E/A 2.0     MV V2 max 100.0 cm/sec      MV max PG 4.0 mmHg      MV V2 mean 62.8 cm/sec      MV mean PG 2.0 mmHg      MV V2 VTI 20.5 cm      MVA(VTI) 3.1 cm^2      MV P1/2t max moraima 109.0 cm/sec      MV P1/2t 73.2 msec      MVA(P1/2t) 3.0 cm^2      MV dec slope 436.0 cm/sec^2      MV dec time 148 sec      Ao pk moraima 130.0 cm/sec      Ao max PG 6.8 mmHg      Ao max PG (full) 1.5 mmHg      Ao V2 mean 86.2 cm/sec      Ao mean PG 4.0 mmHg      Ao mean PG (full) 1.5 mmHg      Ao V2 VTI 23.7 cm      MARCOS(I,A) 2.7 cm^2      MARCOS(I,D) 2.7 cm^2      MARCOS(V,A) 2.8 cm^2      MARCOS(V,D) 2.8 cm^2      LV V1 max PG 5.3 mmHg      LV V1 mean PG 2.5 mmHg      LV V1 max 114.5 cm/sec      LV V1 mean 73.9 cm/sec      LV V1 VTI 20.2 cm      SV(Ao) 135.7 ml      SI(Ao) 75.7 ml/m^2      SV(LVOT) 63.3 ml      SV(RVOT) 29.3 ml      SI(LVOT) 35.3 ml/m^2      PA V2 max 98.9 cm/sec      PA max PG 3.9 mmHg      PA max PG (full) 2.4 mmHg       CV ECHO MARY - PVA(V,A) 1.4 cm^2       CV ECHO MARY - PVA(V,D) 1.4 cm^2      PA acc time 0.1 sec      RV V1 max PG 1.5 mmHg      RV V1 mean PG 1.0 mmHg      RV V1 max 60.6 cm/sec      RV V1 mean 44.8 cm/sec      RV V1 VTI 12.9 cm      TR max moraima 278.7 cm/sec      RVSP(TR) 40.1 mmHg      RAP systole 8.0 mmHg      PA pr(Accel) 32.7 mmHg      Qp/Qs 0.46     MVA P1/2T LCG 2.0 cm^2       CV ECHO MARY - BZI_BMI 31.5 kilograms/m^2       CV ECHO MARY - BSA(HAYCOCK) 1.9 m^2       CV  ECHO MARY - BZI_METRIC_WEIGHT 78.0 kg      BH CV ECHO MARY - BZI_METRIC_HEIGHT 157.5 cm      Target HR (85%) 163 bpm      Max. Pred. HR (100%) 192 bpm       CV VAS BP RIGHT /68 mmHg      TDI S' 12.00 cm/sec      RV Base 3.70 cm      LA Volume Index 24.0 mL/m2      Avg E/e' ratio 8.48     EF(MOD-bp) 61.0 %      Lat Peak E' Darius 12.0 cm/sec      Med Peak E' Darius 11.00 cm/sec           Orders (last 24 hrs)      Start     Ordered    01/09/20 1127  Auto Discontinue GI Panel in 48 Hours if not Collected  ONCE GI PANEL      01/07/20 1126    01/08/20 0200  Vancomycin, Random  Timed      01/07/20 1033    01/07/20 1800  ALPRAZolam (XANAX) tablet 0.5 mg  4 Times Daily      01/07/20 1122    01/07/20 1530  Potassium  Timed      01/07/20 1125    01/07/20 1400  methadone (DOLOPHINE) tablet 2.5 mg  Every 8 Hours Scheduled      01/07/20 1124    01/07/20 1215  Scopolamine (TRANSDERM-SCOP) 1.5 MG/3DAYS patch 1 patch  Every 72 Hours      01/07/20 1124    01/07/20 1127  Gastrointestinal Panel, PCR - Stool, Per Rectum  Once      01/07/20 1126    01/07/20 1126  loperamide (IMODIUM) capsule 2 mg  4 Times Daily PRN      01/07/20 1126    01/07/20 1123  Diet Clear Liquid  Diet Effective Now      01/07/20 1124    01/07/20 1030  perflutren (DEFINITY) 8.476 mg in sodium chloride 0.9 % 10 mL injection  Once      01/07/20 0944    01/07/20 1000  cefTRIAXone (ROCEPHIN) IVPB 2 g/50ml dextrose (premix)  Every 24 Hours      01/06/20 1625    01/07/20 0924  promethazine (PHENERGAN) IV syringe 12.5 mg  Every 6 Hours PRN      01/07/20 0924    01/07/20 0900  FLUoxetine (PROzac) capsule 20 mg  Daily,   Status:  Discontinued      01/06/20 2113 01/07/20 0900  carBAMazepine XR (TEGretol  XR) 12 hr tablet 200 mg  Every 12 Hours Scheduled      01/07/20 0708    01/07/20 0600  CBC & Differential  Daily      01/06/20 1625    01/07/20 0600  Basic Metabolic Panel  Daily      01/06/20 1625    01/07/20 0600  CBC Auto Differential  PROCEDURE ONCE      01/07/20  0003    01/06/20 2200  carBAMazepine (CARBATROL) 12 hr capsule 200 mg  Every 12 Hours Scheduled,   Status:  Discontinued      01/06/20 2113 01/06/20 2200  melatonin tablet 10 mg  Nightly      01/06/20 2113 01/06/20 2200  FLUoxetine (PROzac) capsule 20 mg  Daily      01/06/20 2113 01/06/20 2147  carBAMazepine (TEGretol) 200 MG tablet  - ADS Override Pull     Note to Pharmacy:  Created by cabinet override    01/06/20 2147    01/06/20 2102  Ethanol  Once,   Status:  Canceled      01/06/20 2101 01/06/20 2100  sodium chloride 0.9 % flush 10 mL  Every 12 Hours Scheduled      01/06/20 1625 01/06/20 2028  Blood Culture - Blood,  Once     Comments:  From Different Site Than 1st Blood Culture      01/06/20 1625 01/06/20 2000  Vital Signs  Every 4 Hours      01/06/20 1625    01/06/20 1800  Incentive Spirometry  Every 4 Hours While Awake      01/06/20 1625    01/06/20 1800  Lactic Acid, Plasma  Every 6 Hours     Comments:  Until normalized      01/06/20 1625    01/06/20 1715  sodium chloride 0.9 % infusion  Continuous      01/06/20 1625    01/06/20 1715  famotidine (PEPCID) tablet 40 mg  Daily      01/06/20 1625    01/06/20 1626  Intake & Output  Every Shift      01/06/20 1625    01/06/20 1626  Weigh Patient  Once      01/06/20 1625    01/06/20 1626  Insert Peripheral IV  Once      01/06/20 1625    01/06/20 1626  Saline Lock & Maintain IV Access  Continuous      01/06/20 1625    01/06/20 1626  Place Sequential Compression Device  Once      01/06/20 1625    01/06/20 1626  Maintain Sequential Compression Device  Continuous      01/06/20 1625    01/06/20 1626  Pulse Oximetry,  Spot  Once      01/06/20 1625    01/06/20 1626  Strict Intake & Output  Every Shift      01/06/20 1625    01/06/20 1626  Diet Regular  Diet Effective Now,   Status:  Canceled      01/06/20 1625    01/06/20 1626  Procalcitonin  STAT      01/06/20 1625    01/06/20 1626  Lactic Acid, Plasma  STAT     Comments:  Automatic Reflex Lactate Will  Occur 3 Hours From Result Time With an Initial Lactate Greater Than 2      01/06/20 1625    01/06/20 1626  TSH  Once      01/06/20 1625    01/06/20 1626  Hemoglobin A1c  Once      01/06/20 1625 01/06/20 1626  Blood Culture - Blood,  Once      01/06/20 1625 01/06/20 1626  Urinalysis With Culture If Indicated -  Once,   Status:  Canceled     Comments:  Please do on specimen in lab      01/06/20 1625    01/06/20 1626  Patient Currently On Electrolyte Replacement Protocol - Please Refer to MAR for Protocol Details  Misc Nursing Order (Specify)  Daily     Comments:  Patient Currently On Electrolyte Replacement Protocol - Please Refer to MAR for Protocol Details    01/06/20 1625    01/06/20 1625  melatonin tablet 5 mg  Nightly PRN      01/06/20 1625    01/06/20 1625  bisacodyl (DULCOLAX) suppository 10 mg  Daily PRN      01/06/20 1625    01/06/20 1625  bisacodyl (DULCOLAX) EC tablet 5 mg  Daily PRN      01/06/20 1625    01/06/20 1625  magnesium hydroxide (MILK OF MAGNESIA) 400 MG/5ML suspension 30 mL  Daily PRN      01/06/20 1625    01/06/20 1625  ondansetron (ZOFRAN) tablet 4 mg  Every 6 Hours PRN      01/06/20 1625    01/06/20 1625  ondansetron (ZOFRAN) injection 4 mg  Every 6 Hours PRN,   Status:  Discontinued      01/06/20 1625    01/06/20 1625  potassium chloride (K-DUR,KLOR-CON) CR tablet 40 mEq  As Needed      01/06/20 1625    01/06/20 1625  potassium chloride (KLOR-CON) packet 40 mEq  As Needed      01/06/20 1625    01/06/20 1625  potassium chloride 10 mEq in 100 mL IVPB  Every 1 Hour PRN      01/06/20 1625    01/06/20 1625  magnesium sulfate 4 gram infusion - Mg less than or equal to 1mg/dL  As Needed      01/06/20 1625    01/06/20 1625  magnesium sulfate 3 gram infusion (1gm x 3) - Mg 1.1 - 1.5 mg/dL  As Needed      01/06/20 1625    01/06/20 1625  Magnesium Sulfate 2 gram infusion- Mg 1.6 - 1.9 mg/dL  As Needed      01/06/20 1625    01/06/20 1625  sodium chloride 0.9 % flush 10 mL  As Needed      01/06/20  1625    01/06/20 1625  sodium chloride 0.9 % infusion 40 mL  As Needed      01/06/20 1625    01/06/20 1625  sodium chloride 0.9% - IBW for BMI > 30 bolus 1,572 mL  As Needed      01/06/20 1625    01/06/20 1543  vancomycin (VANCOCIN) 1,250 mg in sodium chloride 0.9 % 250 mL IVPB  Every 12 Hours      01/06/20 1542    01/06/20 1508  acetaminophen (TYLENOL) tablet 1,000 mg  Every 6 Hours PRN      01/06/20 1508    01/06/20 1507  Pharmacy to dose vancomycin  Continuous PRN      01/06/20 1507    01/06/20 1507  Seizure Precautions  Continuous      01/06/20 1506    01/06/20 1507  Clinical [place] Withdrawal Assessment  Once      01/06/20 1506    01/06/20 1506  C-reactive Protein  Once     Comments:  Please do on blood in lab      01/06/20 1505    01/06/20 1505  ALPRAZolam (XANAX) tablet 0.5 mg  Once      01/06/20 1503    01/06/20 1505  Adult Transthoracic Echo Complete W/ Cont if Necessary Per Protocol  Once     Comments:  H/O IVDA, r/o infection    01/06/20 1505    01/06/20 1505  Sedimentation Rate  Once      01/06/20 1505    01/06/20 1503  ALPRAZolam (XANAX) tablet 0.5 mg  4 Times Daily PRN,   Status:  Discontinued      01/06/20 1503    01/06/20 1422  acetaminophen (TYLENOL) tablet 1,000 mg  Once      01/06/20 1420    01/06/20 1416  HYDROmorphone (DILAUDID) injection 0.5 mg  Once,   Status:  Discontinued      01/06/20 1414    01/06/20 1416  Inpatient Admission  Once      01/06/20 1416    01/06/20 1414  Code Status and Medical Interventions:  Continuous      01/06/20 1419    01/06/20 1411  cefTRIAXone (ROCEPHIN) IVPB 1 g/50ml dextrose (premix)  Once      01/06/20 1410    01/06/20 1317  CT Abdomen Pelvis Without Contrast  1 Time Imaging     Comments:  IV CONTRAST ONLY      01/06/20 1258    01/06/20 1259  CT Abdomen Pelvis With Contrast  1 Time Imaging,   Status:  Canceled     Comments:  IV CONTRAST ONLY      01/06/20 1258    01/06/20 1258  potassium chloride 10 mEq in 100 mL IVPB  Once      01/06/20 1256    01/06/20 1203   potassium chloride 10 mEq in 100 mL IVPB  Once      01/06/20 1203    01/06/20 1155  potassium chloride (KLOR-CON) packet 40 mEq  Once      01/06/20 1153    Unscheduled  Oxygen Therapy- Nasal Cannula; Titrate for SPO2: 90% - 95%  Continuous PRN      01/06/20 1625    Unscheduled  Up With Assistance  As Needed      01/06/20 1625    Unscheduled  Follow Acute Urinary Retention Protocol  As Needed      01/06/20 1625    Unscheduled  Magnesium  As Needed      01/06/20 1625    Unscheduled  Potassium  As Needed      01/06/20 1625    --  ALPRAZolam (XANAX) 1 MG tablet  2 Times Daily      01/06/20 0748    --  FLUoxetine (PROzac) 20 MG capsule  Daily      01/06/20 0748    --  methadone (DOLOPHINE) 10 MG tablet  Every 6 Hours PRN,   Status:  Discontinued      01/06/20 0748                Ventilator/Non-Invasive Ventilation Settings (From admission, onward)    None        Operative/Procedure Notes (last 24 hours) (Notes from 01/06/20 1205 through 01/07/20 1205)    No notes of this type exist for this encounter.            Physician Progress Notes (last 24 hours) (Notes from 01/06/20 1205 through 01/07/20 1205)      Collette Leiva MD at 01/06/20 2123        Contacted by nurses about pt showing signs of 'WD'.    Pt denies drinking, but has a history of moving from another state days ago, and is here being managed for infx.     She reports taking 1mg twice a day. We have ordered 0.5mg Q8H PRN, which should keep her from having a seizure, if she is being honest about her use.    Will cover will carbazipine as well per recommendations on dealing with benzo wd.     BP is currently on the low side. will monitor, but would expect opioid and benzo wd to elevate bp pressure.   Lactate was normal, so do not think that sepsis is causing her BP to be much lower than one would expect in WD.    Though will not give clonidine for Opioid wd symptoms.       Electronically signed by Collette Leiva MD at 01/06/20 2132       Consult  Notes (last 24 hours) (Notes from 01/06/20 1205 through 01/07/20 1205)    No notes of this type exist for this encounter.

## 2020-01-07 NOTE — PROGRESS NOTES
Contacted by nurses about pt showing signs of 'WD'.    Pt denies drinking, but has a history of moving from another state days ago, and is here being managed for infx.     She reports taking 1mg twice a day. We have ordered 0.5mg Q8H PRN, which should keep her from having a seizure, if she is being honest about her use.    Will cover will carbazipine as well per recommendations on dealing with benzo wd.     BP is currently on the low side. will monitor, but would expect opioid and benzo wd to elevate bp pressure.   Lactate was normal, so do not think that sepsis is causing her BP to be much lower than one would expect in WD.    Though will not give clonidine for Opioid wd symptoms.

## 2020-01-07 NOTE — PROGRESS NOTES
"SERVICE: [facility] HOSPITALIST    CONSULTANTS:    CHIEF COMPLAINT: Follow-up sepsis secondary to pyelonephritis, drug withdrawal    SUBJECTIVE: The patient reports she is feeling awful and is having pain all over.  Staff notes gagging but no vomiting and patient reports severe nausea not responding to Zofran.  She has been unable to eat or drink.  She notes she is also having loose stools approximately 4 episodes overnight.  She has no urinary complaints.  Staff notes fevers overnight, responded well to Tylenol.    OBJECTIVE:    /95 (BP Location: Right arm, Patient Position: Lying)   Pulse 104   Temp 98.3 °F (36.8 °C) (Oral)   Resp 16   Ht 157.5 cm (62\")   Wt 78 kg (172 lb)   LMP 12/30/2019   SpO2 100%   BMI 31.46 kg/m²     MEDS/LABS REVIEWED AND ORDERED    ALPRAZolam 0.5 mg Oral 4x Daily   carBAMazepine  mg Oral Q12H   cefTRIAXone 2 g Intravenous Q24H   FLUoxetine 20 mg Oral Daily   melatonin 10 mg Oral Nightly   methadone 2.5 mg Oral Q8H   [START ON 1/8/2020] pantoprazole 40 mg Intravenous Q AM   potassium chloride 40 mEq Oral Once   Scopolamine 1 patch Transdermal Q72H   sodium chloride 10 mL Intravenous Q12H     Physical Exam   Constitutional: She is oriented to person, place, and time. She appears well-developed and well-nourished.   Ill appearance   HENT:   Head: Normocephalic and atraumatic.   Eyes: Pupils are equal, round, and reactive to light. EOM are normal.   Cardiovascular:   Tachycardic, regular   Pulmonary/Chest: Effort normal and breath sounds normal. No stridor. No respiratory distress. She has no wheezes.   Abdominal: Soft. Bowel sounds are normal. She exhibits no distension. There is no guarding.   Generalized tenderness all quadrants   Musculoskeletal: She exhibits no edema.   Neurological: She is alert and oriented to person, place, and time.   Skin: Skin is warm and dry. No erythema.   Tattooing of skin noted   Psychiatric:   Calm   Vitals " reviewed.    LAB/DIAGNOSTICS:    Lab Results (last 24 hours)     Procedure Component Value Units Date/Time    Urine Culture - Urine, Urine, Clean Catch [[id number]]  (Abnormal) Collected:  01/06/20 0833    Specimen:  Urine, Clean Catch Updated:  01/07/20 0840     Urine Culture >100,000 CFU/mL Escherichia coli    Magnesium [[id number]]  (Abnormal) Collected:  01/07/20 0746    Specimen:  Blood Updated:  01/07/20 0757     Magnesium 1.3 mg/dL     Basic Metabolic Panel [[id number]]  (Abnormal) Collected:  01/07/20 0506    Specimen:  Blood Updated:  01/07/20 0616     Glucose 122 mg/dL      BUN 10 mg/dL      Creatinine 1.05 mg/dL      Sodium 135 mmol/L      Potassium 3.5 mmol/L      Chloride 99 mmol/L      CO2 22.0 mmol/L      Calcium 7.4 mg/dL      eGFR Non African Amer 62 mL/min/1.73      BUN/Creatinine Ratio 9.5     Anion Gap 14.0 mmol/L     Narrative:       GFR Normal >60  Chronic Kidney Disease <60  Kidney Failure <15      Lactic Acid, Plasma [[id number]]  (Normal) Collected:  01/07/20 0506    Specimen:  Blood Updated:  01/07/20 0612     Lactate 1.5 mmol/L     CBC & Differential [[id number]] Collected:  01/07/20 0506    Specimen:  Blood Updated:  01/07/20 0603    Narrative:       The following orders were created for panel order CBC & Differential.  Procedure                               Abnormality         Status                     ---------                               -----------         ------                     CBC Auto Differential[[id number]]        Abnormal            Final result                 Please view results for these tests on the individual orders.    CBC Auto Differential [[id number]]  (Abnormal) Collected:  01/07/20 0506    Specimen:  Blood Updated:  01/07/20 0603     WBC 12.34 10*3/mm3      RBC 3.41 10*6/mm3      Hemoglobin 10.1 g/dL      Hematocrit 30.2 %      MCV 88.6 fL      MCH 29.6 pg      MCHC 33.4 g/dL      RDW 15.6 %      RDW-SD 50.7 fl      MPV 11.3 fL      Platelets 116 10*3/mm3       Neutrophil % 79.6 %      Lymphocyte % 7.5 %      Monocyte % 12.2 %      Eosinophil % 0.1 %      Basophil % 0.1 %      Immature Grans % 0.5 %      Neutrophils, Absolute 9.83 10*3/mm3      Lymphocytes, Absolute 0.92 10*3/mm3      Monocytes, Absolute 1.51 10*3/mm3      Eosinophils, Absolute 0.01 10*3/mm3      Basophils, Absolute 0.01 10*3/mm3      Immature Grans, Absolute 0.06 10*3/mm3      nRBC 0.0 /100 WBC     Blood Culture - Blood, Arm, Right [[id number]] Collected:  01/06/20 2117    Specimen:  Blood from Arm, Right Updated:  01/06/20 2118    C-reactive Protein [[id number]]  (Abnormal) Collected:  01/06/20 1658    Specimen:  Blood Updated:  01/06/20 1957     C-Reactive Protein 29.41 mg/dL     Lactic Acid, Plasma [[id number]]  (Normal) Collected:  01/06/20 1658    Specimen:  Blood Updated:  01/06/20 1723     Lactate 0.9 mmol/L     Procalcitonin [[id number]]  (Abnormal) Collected:  01/06/20 1111    Specimen:  Blood Updated:  01/06/20 1708     Procalcitonin 5.17 ng/mL     TSH [[id number]]  (Normal) Collected:  01/06/20 1111    Specimen:  Blood Updated:  01/06/20 1704     TSH 0.318 uIU/mL     Blood Culture - Blood, Hand, Right [[id number]] Collected:  01/06/20 1702    Specimen:  Blood from Hand, Right Updated:  01/06/20 1702    Hemoglobin A1c [[id number]]  (Normal) Collected:  01/06/20 1111    Specimen:  Blood Updated:  01/06/20 1700     Hemoglobin A1C 5.00 %     Narrative:       Hemoglobin A1C Ranges:    Increased Risk for Diabetes  5.7% to 6.4%  Diabetes                     >= 6.5%  Diabetic Goal                < 7.0%    Sedimentation Rate [[id number]]  (Abnormal) Collected:  01/06/20 1111    Specimen:  Blood Updated:  01/06/20 1515     Sed Rate 41 mm/hr         No orders to display        Ct Abdomen Pelvis Without Contrast    Result Date: 1/6/2020  There is a subtle findings that may represent edema in the right kidney. Correlation with urinalysis recommended to check for possible pyelonephritis.. No stones are identified.  "There is no evidence of bowel obstruction  Otherwise study is normal except for mild prominence of the liver  This report was finalized on 1/6/2020 2:00 PM by Dr. Td Lindo MD.      ASSESSMENT/PLAN:  Sepsis 2/2 acute right E. coli pyelonephritis:  History of skin abscesses:  Ruled out pancreatitis:  CT abdomen/pelvis report noted  Continue Rocephin 2 g daily, discontinue vancomycin  Lipase low, repeat amylase/lipase pending now  Sed rate, CRP, procalcitonin all elevated, trend  Lactate normal  Monitor closely    Acute drug withdrawal:  Patient denies methadone and benzodiazepine use in the past 2 weeks however UDS positive for both  Feel this is likely withdrawal from both medications, schedule Xanax 0.5 mg every 6 hours and methadone 2.5 mg every 8 hours and monitor for effect  Started on Tegretol per overnight attending  GLADYS and seizure, fall precautions in place    Acute kidney injury:  Electrolyte imbalance:  Continue IV hydration, creatinine 1.05, similar to yesterday  Electrolyte replacement protocols in place    Anemia/Thrombocytopenia:  No active blood loss noted, holding Lovenox for now    Tachycardia: Likely secondary to sepsis, echo pending    PLAN FOR DISPOSITION: Home when able    LESLIE Kendall  Hospitalist, [facility]  01/07/20  11:25 AM    \"Dictated utilizing Dragon dictation\"    "

## 2020-01-08 LAB
ANION GAP SERPL CALCULATED.3IONS-SCNC: 12.6 MMOL/L (ref 5–15)
BACTERIA SPEC AEROBE CULT: ABNORMAL
BACTERIA SPEC AEROBE CULT: NORMAL
BASOPHILS # BLD AUTO: 0.02 10*3/MM3 (ref 0–0.2)
BASOPHILS NFR BLD AUTO: 0.2 % (ref 0–1.5)
BUN BLD-MCNC: 7 MG/DL (ref 6–20)
BUN/CREAT SERPL: 7.4 (ref 7–25)
CALCIUM SPEC-SCNC: 7.5 MG/DL (ref 8.6–10.5)
CHLORIDE SERPL-SCNC: 104 MMOL/L (ref 98–107)
CO2 SERPL-SCNC: 20.4 MMOL/L (ref 22–29)
CREAT BLD-MCNC: 0.94 MG/DL (ref 0.57–1)
CRP SERPL-MCNC: 13.1 MG/DL (ref 0–0.5)
D-LACTATE SERPL-SCNC: 1.4 MMOL/L (ref 0.5–2)
DEPRECATED RDW RBC AUTO: 54.8 FL (ref 37–54)
EOSINOPHIL # BLD AUTO: 0.08 10*3/MM3 (ref 0–0.4)
EOSINOPHIL NFR BLD AUTO: 0.9 % (ref 0.3–6.2)
ERYTHROCYTE [DISTWIDTH] IN BLOOD BY AUTOMATED COUNT: 16.2 % (ref 12.3–15.4)
GFR SERPL CREATININE-BSD FRML MDRD: 71 ML/MIN/1.73
GLUCOSE BLD-MCNC: 95 MG/DL (ref 65–99)
HCT VFR BLD AUTO: 28.3 % (ref 34–46.6)
HGB BLD-MCNC: 9.2 G/DL (ref 12–15.9)
IMM GRANULOCYTES # BLD AUTO: 0.04 10*3/MM3 (ref 0–0.05)
IMM GRANULOCYTES NFR BLD AUTO: 0.5 % (ref 0–0.5)
LYMPHOCYTES # BLD AUTO: 1.34 10*3/MM3 (ref 0.7–3.1)
LYMPHOCYTES NFR BLD AUTO: 15.1 % (ref 19.6–45.3)
MAGNESIUM SERPL-MCNC: 2.1 MG/DL (ref 1.6–2.6)
MCH RBC QN AUTO: 29.7 PG (ref 26.6–33)
MCHC RBC AUTO-ENTMCNC: 32.5 G/DL (ref 31.5–35.7)
MCV RBC AUTO: 91.3 FL (ref 79–97)
MONOCYTES # BLD AUTO: 1.05 10*3/MM3 (ref 0.1–0.9)
MONOCYTES NFR BLD AUTO: 11.9 % (ref 5–12)
NEUTROPHILS # BLD AUTO: 6.32 10*3/MM3 (ref 1.7–7)
NEUTROPHILS NFR BLD AUTO: 71.4 % (ref 42.7–76)
NRBC BLD AUTO-RTO: 0 /100 WBC (ref 0–0.2)
PLATELET # BLD AUTO: 115 10*3/MM3 (ref 140–450)
PMV BLD AUTO: 11.5 FL (ref 6–12)
POTASSIUM BLD-SCNC: 3.6 MMOL/L (ref 3.5–5.2)
POTASSIUM BLD-SCNC: 3.6 MMOL/L (ref 3.5–5.2)
POTASSIUM BLD-SCNC: 3.9 MMOL/L (ref 3.5–5.2)
PROCALCITONIN SERPL-MCNC: 2.23 NG/ML (ref 0.1–0.25)
RBC # BLD AUTO: 3.1 10*6/MM3 (ref 3.77–5.28)
SODIUM BLD-SCNC: 137 MMOL/L (ref 136–145)
WBC NRBC COR # BLD: 8.85 10*3/MM3 (ref 3.4–10.8)

## 2020-01-08 PROCEDURE — 94799 UNLISTED PULMONARY SVC/PX: CPT

## 2020-01-08 PROCEDURE — 80048 BASIC METABOLIC PNL TOTAL CA: CPT | Performed by: NURSE PRACTITIONER

## 2020-01-08 PROCEDURE — 84145 PROCALCITONIN (PCT): CPT | Performed by: NURSE PRACTITIONER

## 2020-01-08 PROCEDURE — 99232 SBSQ HOSP IP/OBS MODERATE 35: CPT | Performed by: NURSE PRACTITIONER

## 2020-01-08 PROCEDURE — 86140 C-REACTIVE PROTEIN: CPT | Performed by: NURSE PRACTITIONER

## 2020-01-08 PROCEDURE — 84132 ASSAY OF SERUM POTASSIUM: CPT | Performed by: NURSE PRACTITIONER

## 2020-01-08 PROCEDURE — 83735 ASSAY OF MAGNESIUM: CPT | Performed by: NURSE PRACTITIONER

## 2020-01-08 PROCEDURE — 83605 ASSAY OF LACTIC ACID: CPT | Performed by: HOSPITALIST

## 2020-01-08 PROCEDURE — 25010000002 CEFTRIAXONE SODIUM-DEXTROSE 2-2.22 GM-%(50ML) RECONSTITUTED SOLUTION: Performed by: NURSE PRACTITIONER

## 2020-01-08 PROCEDURE — 85025 COMPLETE CBC W/AUTO DIFF WBC: CPT | Performed by: NURSE PRACTITIONER

## 2020-01-08 PROCEDURE — 84132 ASSAY OF SERUM POTASSIUM: CPT | Performed by: HOSPITALIST

## 2020-01-08 RX ADMIN — MELATONIN TAB 5 MG 10 MG: 5 TAB at 21:27

## 2020-01-08 RX ADMIN — ALPRAZOLAM 0.5 MG: 0.25 TABLET ORAL at 14:04

## 2020-01-08 RX ADMIN — ACETAMINOPHEN 1000 MG: 500 TABLET, FILM COATED ORAL at 01:50

## 2020-01-08 RX ADMIN — VANCOMYCIN HYDROCHLORIDE 125 MG: 125 CAPSULE ORAL at 21:51

## 2020-01-08 RX ADMIN — POTASSIUM CHLORIDE 40 MEQ: 1500 TABLET, EXTENDED RELEASE ORAL at 10:34

## 2020-01-08 RX ADMIN — POTASSIUM CHLORIDE 40 MEQ: 1500 TABLET, EXTENDED RELEASE ORAL at 14:04

## 2020-01-08 RX ADMIN — METHADONE HYDROCHLORIDE 10 MG: 10 TABLET ORAL at 14:04

## 2020-01-08 RX ADMIN — METHADONE HYDROCHLORIDE 10 MG: 10 TABLET ORAL at 05:34

## 2020-01-08 RX ADMIN — ALPRAZOLAM 0.5 MG: 0.25 TABLET ORAL at 18:42

## 2020-01-08 RX ADMIN — VANCOMYCIN HYDROCHLORIDE 125 MG: 125 CAPSULE ORAL at 18:42

## 2020-01-08 RX ADMIN — ALPRAZOLAM 0.5 MG: 0.25 TABLET ORAL at 10:33

## 2020-01-08 RX ADMIN — SODIUM CHLORIDE 100 ML/HR: 9 INJECTION, SOLUTION INTRAVENOUS at 10:52

## 2020-01-08 RX ADMIN — ALPRAZOLAM 0.5 MG: 0.25 TABLET ORAL at 21:27

## 2020-01-08 RX ADMIN — CARBAMAZEPINE 200 MG: 100 TABLET, EXTENDED RELEASE ORAL at 10:34

## 2020-01-08 RX ADMIN — PANTOPRAZOLE SODIUM 40 MG: 40 INJECTION, POWDER, FOR SOLUTION INTRAVENOUS at 06:58

## 2020-01-08 RX ADMIN — FLUOXETINE 20 MG: 20 CAPSULE ORAL at 10:34

## 2020-01-08 RX ADMIN — VANCOMYCIN HYDROCHLORIDE 125 MG: 125 CAPSULE ORAL at 10:34

## 2020-01-08 RX ADMIN — SODIUM CHLORIDE, PRESERVATIVE FREE 10 ML: 5 INJECTION INTRAVENOUS at 21:29

## 2020-01-08 RX ADMIN — VANCOMYCIN HYDROCHLORIDE 125 MG: 125 CAPSULE ORAL at 14:04

## 2020-01-08 RX ADMIN — ACETAMINOPHEN 1000 MG: 500 TABLET, FILM COATED ORAL at 18:42

## 2020-01-08 RX ADMIN — CEFTRIAXONE 2 G: 2 INJECTION, SOLUTION INTRAVENOUS at 10:51

## 2020-01-08 RX ADMIN — SODIUM CHLORIDE 100 ML/HR: 9 INJECTION, SOLUTION INTRAVENOUS at 01:33

## 2020-01-08 RX ADMIN — METHADONE HYDROCHLORIDE 10 MG: 10 TABLET ORAL at 21:28

## 2020-01-08 RX ADMIN — ACETAMINOPHEN 1000 MG: 500 TABLET, FILM COATED ORAL at 10:51

## 2020-01-08 RX ADMIN — CARBAMAZEPINE 200 MG: 100 TABLET, EXTENDED RELEASE ORAL at 21:51

## 2020-01-08 NOTE — PAYOR COMM NOTE
"Talia Sepulveda (29 y.o. Female)   ATTN: [facility] STAY WELL FAX# [phone]  REF: [id number]  RE: CLINICALS FOR INPATIENT AUTHORIZATION     Alyssia Shirley  Utilization Review/Room Reservations  Phone:[phone] ,Fransisca/Mykmwl-[phone], [phone], [phone] or [phone]  Fax: [phone]  Email: [email or web address]  Please call, fax back, or email with authorization or any questions! Thanks!    Date of Birth Social Security Number Address Home Phone MRN    1990  [id number] [address]  [id number]    Gnosticism Marital Status          Unknown Unknown       Admission Date Admission Type Admitting Provider Attending Provider Department, Room/Bed    1/6/20 Emergency Karissa Hoffman DO Ringswald, Madonna, DO [facility] MED SURG, 1408/1    Discharge Date Discharge Disposition Discharge Destination                       Attending Provider:  Karissa Hoffman DO    Allergies:  Trazodone    Isolation:  Spore   Infection:  C.difficile (01/07/20)   Code Status:  CPR    Ht:  157.5 cm (62\")   Wt:  78 kg (172 lb)    Admission Cmt:  None   Principal Problem:  None                Active Insurance as of 1/6/2020     Primary Coverage     Payor Plan Insurance Group Employer/Plan Group    MEDICAID OUT OF STATE MISC MEDICAID OUT OF STATE      Coverage Address Coverage Phone Number Coverage Fax Number Effective Dates    [address] [phone]  1/1/2020 - None Entered    [place] [id number]       Subscriber Name Subscriber Birth Date Member ID       TALIA SEPULVEDA 1990 [id number]                 Emergency Contacts      (Rel.) Home Phone Work Phone Mobile Phone    Cara Lindo (Grandparent) -- -- [phone]               History & Physical      Miranda Lozano, LESLIE at 01/06/20 1411     Attestation signed by Karissa Hoffman DO at 01/06/20 7959    Review note and saw patient and agree with nurse " practitioner.                   [facility] HOSPITALIST     Provider, No Known    CHIEF COMPLAINT: n/v/abdominal pain    HISTORY OF PRESENT ILLNESS:  The patient is a 29-year-old female that presented to the emergency department secondary to 6 days of fever, chills, nausea, vomiting, abdominal pain, back pain, dysuria.  She notes she moved here from [place] 6 days ago.  She reports she has a past history of IV drug abuse with heroin approximately 5 years ago.  She has been on methadone and Xanax last doses before [place].      She reports a history of skin abscesses in past, depression, IVDA.  She reports she is not sexually active, LMP approximately 2 weeks ago, no history of STDs.    She otherwise denies headache/rhinorrhea/nasal congestion/lightheadedness/syncopal sensation/cough/soa/diarrhea/chest pain/recent illness/sick exposures/change in bowel or bladder habits/no weight change/bloody emesis or bloody stools/change in medications or any other new concerns.    Past Medical History:   Diagnosis Date   • Addiction to drug (CMS/[facility])    • Depression      History reviewed. No pertinent surgical history.  History reviewed. No pertinent family history.  Social History     Tobacco Use   • Smoking status: Current Every Day Smoker     Packs/day: 1.00   Substance Use Topics   • Alcohol use: Not Currently   • Drug use: Not Currently       (Not in a hospital admission)  Allergies:  Patient has no known allergies.      There is no immunization history on file for this patient.    REVIEW OF SYSTEMS:  Please see the above history of present illness for pertinent positives and negatives.  The remainder of the patient's systems have been reviewed and are negative.    Vital Signs  Temp:  [99.3 °F (37.4 °C)-100.1 °F (37.8 °C)] 100.1 °F (37.8 °C)  Heart Rate:  [108-128] 118  Resp:  [16-18] 16  BP: (104-146)/() 130/79   Body mass index is 30.11 kg/m².    Flowsheet Rows      First Filed Value   Admission  "Height  160 cm (63\") Documented at 01/06/2020 0746   Admission Weight  77.1 kg (170 lb) Documented at 01/06/2020 0746           Physical Exam   Constitutional: She is oriented to person, place, and time. She appears well-developed and well-nourished.   HENT:   Head: Normocephalic and atraumatic.   Eyes: Pupils are equal, round, and reactive to light. EOM are normal.   Pupils bilaterally 5 mm   Cardiovascular:   Tachycardic, regular   Pulmonary/Chest: Effort normal and breath sounds normal. No stridor. No respiratory distress. She has no wheezes.   Abdominal: Soft. Bowel sounds are normal. She exhibits no distension. There is no tenderness. There is no guarding.   Musculoskeletal: She exhibits no edema.   Neurological: She is alert and oriented to person, place, and time.   Skin: Skin is warm and dry. No erythema.   Extensive tattooing of skin noted   Psychiatric: She has a normal mood and affect. Her behavior is normal.   Vitals reviewed.    Emotional Behavior:    Judgement and Insight: Good   Mental Status:  Alertness alert   Memory: Good   Mood and Affect:         Depression anxious               Anxiety anxious    Debilities:   Physical Weakness none   Handicaps none   Disabilities none   Agitation mild     Results Review:    I reviewed the patient's new clinical results.  Lab Results (most recent)     Procedure Component Value Units Date/Time    Comprehensive Metabolic Panel [[id number]]  (Abnormal) Collected:  01/06/20 1111    Specimen:  Blood Updated:  01/06/20 1141     Glucose 132 mg/dL      BUN 10 mg/dL      Creatinine 1.01 mg/dL      Sodium 130 mmol/L      Potassium 2.6 mmol/L      Chloride 89 mmol/L      CO2 23.3 mmol/L      Calcium 7.8 mg/dL      Total Protein 6.8 g/dL      Albumin 3.10 g/dL      ALT (SGPT) 20 U/L      AST (SGOT) 16 U/L      Alkaline Phosphatase 87 U/L      Total Bilirubin 0.6 mg/dL      eGFR Non African Amer 65 mL/min/1.73      Globulin 3.7 gm/dL      A/G Ratio 0.8 g/dL      " BUN/Creatinine Ratio 9.9     Anion Gap 17.7 mmol/L     Narrative:       GFR Normal >60  Chronic Kidney Disease <60  Kidney Failure <15      Lipase [[id number]]  (Abnormal) Collected:  01/06/20 1111    Specimen:  Blood Updated:  01/06/20 1137     Lipase 6 U/L     Ethanol [[id number]] Collected:  01/06/20 1111    Specimen:  Blood Updated:  01/06/20 1137     Ethanol <10 mg/dL      Ethanol % <0.010 %     Urine Culture - Urine, Urine, Clean Catch [[id number]] Collected:  01/06/20 0833    Specimen:  Urine, Clean Catch Updated:  01/06/20 1119    CBC & Differential [[id number]] Collected:  01/06/20 1111    Specimen:  Blood Updated:  01/06/20 1117    Narrative:       The following orders were created for panel order CBC & Differential.  Procedure                               Abnormality         Status                     ---------                               -----------         ------                     CBC Auto Differential[[id number]]        Abnormal            Final result                 Please view results for these tests on the individual orders.    CBC Auto Differential [[id number]]  (Abnormal) Collected:  01/06/20 1111    Specimen:  Blood Updated:  01/06/20 1117     WBC 16.39 10*3/mm3      RBC 3.89 10*6/mm3      Hemoglobin 11.3 g/dL      Hematocrit 34.0 %      MCV 87.4 fL      MCH 29.0 pg      MCHC 33.2 g/dL      RDW 15.0 %      RDW-SD 48.9 fl      MPV 11.4 fL      Platelets 133 10*3/mm3      Neutrophil % 87.4 %      Lymphocyte % 3.1 %      Monocyte % 8.6 %      Eosinophil % 0.0 %      Basophil % 0.1 %      Immature Grans % 0.8 %      Neutrophils, Absolute 14.33 10*3/mm3      Lymphocytes, Absolute 0.51 10*3/mm3      Monocytes, Absolute 1.41 10*3/mm3      Eosinophils, Absolute 0.00 10*3/mm3      Basophils, Absolute 0.01 10*3/mm3      Immature Grans, Absolute 0.13 10*3/mm3     Urinalysis, Microscopic Only - Urine, Clean Catch [[id number]]  (Abnormal) Collected:  01/06/20 0833    Specimen:  Urine, Clean Catch Updated:   01/06/20 0942     RBC, UA 13-20 /HPF      WBC, UA 6-12 /HPF      Bacteria, UA 2+ /HPF      Squamous Epithelial Cells, UA 0-2 /HPF      Hyaline Casts, UA None Seen /LPF      Methodology Manual Light Microscopy    Urine Drug Screen - [[id number]]  (Abnormal) Collected:  01/06/20 0833    Specimen:  Urine Updated:  01/06/20 0930     THC, Screen, Urine Negative     Phencyclidine (PCP), Urine Negative     Cocaine Screen, Urine Negative     Methamphetamine, Ur Negative     Opiate Screen Negative     Amphetamine Screen, Urine Negative     Benzodiazepine Screen, Urine Positive     Tricyclic Antidepressants Screen Negative     Methadone Screen, Urine Positive     Barbiturates Screen, Urine Negative     Oxycodone Screen, Urine Negative     Propoxyphene Screen Negative     Buprenorphine, Screen, Urine Negative    Narrative:       Urine drug screen results are to be used for medical purposes only.  They are not to be used for legal purposes such as employment testing.  Negative results do not necessarily mean the complete absence of a subtance, but rather that the result is less than the cutoff for that substance.  Positive results are unconfirmed and considered Preliminary Positive.  [facility] does not automatically confirm Postitive Unconfirmed results.  The physician may request (order) an Unconfirmed Positive result to be sent out for confirmation.      Negative Thresholds for Drugs Screened:    THC screen, urine                          50 ng/ml  Phenycyclidine (PCP), urine                25 ng/ml  Cocaine screen, urine                     150 ng/ml  Methamphetamine, urine                    500 ng/ml  Opiate screen, urine                      100 ng/ml  Amphetamine screen, urine                 500 ng/ml  Benzodiazepine screen, urine              150 ng/ml  Tricyclic Antidepressants screen, urine   300 ng/ml  Methadone screen, urine                   200 ng/ml  Barbiturates screen, urine                200  ng/ml  Oxycodone screen, urine                   100 ng/ml  Propoxyphene screen, urine                300 ng/ml  Buprenorphine screen, urine                10 ng/ml    Pregnancy, Urine - Urine, Clean Catch [[id number]]  (Normal) Collected:  01/06/20 0833    Specimen:  Urine, Clean Catch Updated:  01/06/20 0924     HCG, Urine QL Negative    Urinalysis With Microscopic If Indicated (No Culture) - Urine, Clean Catch [[id number]]  (Abnormal) Collected:  01/06/20 0833    Specimen:  Urine, Clean Catch Updated:  01/06/20 0922     Color, UA Yellow     Appearance, UA Clear     pH, UA 6.0     Specific Gravity, UA 1.010     Glucose, UA Negative     Ketones, UA Negative     Bilirubin, UA Negative     Blood, UA Moderate (2+)     Protein,  mg/dL (2+)     Leuk Esterase, UA Small (1+)     Nitrite, UA Positive     Urobilinogen, UA 0.2 E.U./dL    Rapid Strep A Screen - Swab, Throat [[id number]]  (Normal) Collected:  01/06/20 0833    Specimen:  Swab from Throat Updated:  01/06/20 0922     Strep A Ag Negative    Beta Strep Culture, Throat - Swab, Throat [[id number]] Collected:  01/06/20 0833    Specimen:  Swab from Throat Updated:  01/06/20 0922    Influenza Antigen, Rapid - Swab, Nasopharynx [[id number]]  (Normal) Collected:  01/06/20 0833    Specimen:  Swab from Nasopharynx Updated:  01/06/20 0922     Influenza A Ag, EIA Negative     Influenza B Ag, EIA Negative          Imaging Results (Most Recent)     Procedure Component Value Units Date/Time    CT Abdomen Pelvis Without Contrast [[id number]] Collected:  01/06/20 1352     Updated:  01/06/20 1406    Narrative:       CT abdomen and pelvis without contrast   01/06/2020     HISTORY:  Generalized abdomen pain and vomiting for 6 days     COMPARISON:  NONE     TECHNIQUE:    CT of Abdomen and Pelvis without contrast performed.  Sagittal and  Coronal reconstructions performed. Radiation dose reduction techniques  included automated exposure control or exposure modulation based on  body  size. Radiation audit for CT and nuclear cardiology exams in the last 12  months: 0.      FINDINGS:    Abdomen:  Lung bases are clear. Liver slightly enlarged. There are no focal  lesions. The gallbladder, spleen, pancreas, and adrenal glands are  normal.. The left kidney appears normal. The right kidney may be  slightly swollen with effacement of the fatty tissue around the renal  pelvis. No definite stranding is noted around the kidney. There are no  stones. The aorta is normal in size. The bowel is normal.       Pelvis:  Uterus and right ovary appear normal. Left ovary has a small cyst  measuring 3 cm in diameter. Bones are unremarkable.         Impression:       There is a subtle findings that may represent edema in the  right kidney. Correlation with urinalysis recommended to check for  possible pyelonephritis.. No stones are identified. There is no evidence  of bowel obstruction     Otherwise study is normal except for mild prominence of the liver     This report was finalized on 1/6/2020 2:00 PM by Dr. Td Lindo MD.           reviewed    ECG/EMG Results (most recent)     None        Pending    Assessment/Plan   Sepsis 2/2 acute right pyelonephritis: (WBCC, heart rate, source)  H/O skin abscesses:  Check lactic acid, procalcitonin, urine C&S, sed rate, crp  Rocephin 2 g IV daily, add vancomycin pharmacy to dose to cover for MRSA  IV hydration  Monitor    Acute benzodiazepine withdrawal:  Give xanax 0.5 mg now then every 6 hours as needed  CIWA protocol  Seizure precautions    Tachycardia:  Likely 2/2 all above however rule out SBE with history of IV drug abuse    Electrolyte imbalance:  Electrolyte replacement protocol added    Hyponatremia: Likely 2/2 volume depletion  Continue IV hydration, recheck in a.m.    UDS positive for methadone and benzodiazepines  Patient denies use in over 2 weeks    I discussed the patients findings and my recommendations with patient.     Miranda Lozano,  LESLIE  01/06/20  3:13 PM            Electronically signed by Karissa Hoffman DO at 01/06/20 1709       Vital Signs (last 2 days)     Date/Time   Temp   Temp src   Pulse   Resp   BP   Patient Position   SpO2    01/08/20 1236   --   --   92   --   --   --   95 after doing pursed lip breathing, RN informed    SpO2: after doing pursed lip breathing, RN informed at 01/08/20 1236    01/08/20 1230   --   --   90   --   --   --   (!) 82 encouraged deep breathing, SpO2 rcio to 88%    SpO2: encouraged deep breathing, SpO2 rico to 88% at 01/08/20 1230    01/08/20 1056   99.4 (37.4)   Axillary   --   --   --   --   --    01/08/20 0540   (!) 100.9 (38.3)   Oral   86   16   104/69   Lying   96    01/08/20 0332   99.7 (37.6)   Oral   --   --   --   --   --    01/08/20 0138   (!) 100.6 (38.1)   Oral   97   18   --   --   96    01/08/20 0001   98.9 (37.2)   Oral   100   16   104/72   Lying   96    01/07/20 1930   99.2 (37.3)   Oral   92   16   100/67   Lying   92    01/07/20 1556   98.1 (36.7)   Oral   103   16   125/83   Lying   100    01/07/20 1121   98.3 (36.8)   Oral   104   16   131/95   Lying   100    01/07/20 0958   98.5 (36.9)   Oral   --   --   --   --   --    01/07/20 0943   --   --   --   --   108/68   --   --    01/07/20 0616   99.1 (37.3)   Oral   94   16   108/68   Lying   94    01/07/20 0407   (!) 101.2 (38.4)   Oral   --   --   --   --   --    01/07/20 0324   (!) 102.4 (39.1)   Oral   111   16   109/69   Lying   96    01/06/20 1925   (!) 100.9 (38.3)   Oral   112   20   119/65   Lying   97    01/06/20 1704   --   --   92   --   --   --   96    01/06/20 1633   (!) 100.7 (38.2)   Oral   113   16   111/69   Lying   95    01/06/20 1545   --   --   109   --   121/87   --   --    01/06/20 1530   --   --   115   --   128/64   --   95    01/06/20 1525   --   --   120   --   --   --   97    01/06/20 1445   --   --   (!) 122   --   111/68   --   98    01/06/20 1430   --   --   --   --   141/83   --   95    01/06/20 1422    --   --   118   --   --   --   99    01/06/20 1415   --   --   --   --   130/79   --   100    01/06/20 1400   --   --   (!) 125   --   104/88   --   100    01/06/20 1349   --   --   (!) 121   --   --   --   98    01/06/20 1345   --   --   --   --   (!) 146/117   --   99    01/06/20 1311   100.1 (37.8)   Oral   (!) 123   16   131/75   Lying   97    01/06/20 1245   --   --   --   --   122/93   --   97    01/06/20 1243   --   --   (!) 125   --   --   --   99    01/06/20 1230   --   --   (!) 121   --   127/76   --   97    01/06/20 1143   --   --   (!) 124   --   --   --   100    01/06/20 1130   --   --   (!) 121   --   140/88   --   98    01/06/20 1124   --   --   (!) 128   --   --   --   100    01/06/20 1000   --   --   --   --   136/91   --   100    01/06/20 0946   100 (37.8)   Oral   117   --   --   --   99    01/06/20 0945   --   --   --   --   140/83   --   99    01/06/20 0845   --   --   108   --   113/78   --   99    01/06/20 0842   --   --   --   --   --   --   99    01/06/20 0841   --   --   --   --   121/73   --   --    01/06/20 0746   99.3 (37.4)   --   (!) 127   18   122/89   --   97            Lines, Drains & Airways    Active LDAs     Name:   Placement date:   Placement time:   Site:   Days:    Peripheral IV 01/07/20 0511 Left Antecubital   01/07/20    0511    Antecubital   1         Inactive LDAs     Name:   Placement date:   Placement time:   Removal date:   Removal time:   Site:   Days:    [REMOVED] Peripheral IV 01/06/20 1145 External Jugular   01/06/20    1145    01/07/20    0407    External Jugular   less than 1                CIWA (last 2 days)     Date/Time CIWA-Ar Score    01/08/20 1051  5    01/06/20 2020  13    01/06/20 1700  0        COWS (last 2 days)     None          Facility-Administered Medications as of 1/8/2020   Medication Dose Route Frequency Provider Last Rate Last Dose   • [COMPLETED] acetaminophen (TYLENOL) tablet 1,000 mg  1,000 mg Oral Once Arnol Oliver MD   1,000 mg at  01/06/20 1423   • acetaminophen (TYLENOL) tablet 1,000 mg  1,000 mg Oral Q6H PRN Miranda Lozano APRN   1,000 mg at 01/08/20 1051   • [COMPLETED] ALPRAZolam (XANAX) tablet 0.5 mg  0.5 mg Oral Once Miranda Lozano APRN   0.5 mg at 01/06/20 1518   • ALPRAZolam (XANAX) tablet 0.5 mg  0.5 mg Oral 4x Daily Miranda Lozano APRN   0.5 mg at 01/08/20 1404   • bisacodyl (DULCOLAX) EC tablet 5 mg  5 mg Oral Daily PRN Miranda Lozano APRN       • bisacodyl (DULCOLAX) suppository 10 mg  10 mg Rectal Daily PRN Miranda Lozano APRN       • [COMPLETED] carBAMazepine (TEGretol) 200 MG tablet  - ADS Override Pull        200 mg at 01/06/20 2151   • carBAMazepine XR (TEGretol  XR) 12 hr tablet 200 mg  200 mg Oral Q12H Collette Leiva MD   200 mg at 01/08/20 1034   • [COMPLETED] cefTRIAXone (ROCEPHIN) IVPB 1 g/50ml dextrose (premix)  1 g Intravenous Once Arnol Oliver MD   Stopped at 01/06/20 1531   • cefTRIAXone (ROCEPHIN) IVPB 2 g/50ml dextrose (premix)  2 g Intravenous Q24H Miranda Lozano APRN 100 mL/hr at 01/08/20 1051 2 g at 01/08/20 1051   • [COMPLETED] dicyclomine (BENTYL) injection 20 mg  20 mg Intramuscular Once Arnol Oliver MD   20 mg at 01/06/20 1004   • FLUoxetine (PROzac) capsule 20 mg  20 mg Oral Daily Collette Leiva MD   20 mg at 01/08/20 1034   • loperamide (IMODIUM) capsule 2 mg  2 mg Oral 4x Daily PRN Miranda Lozano, APRN       • [COMPLETED] LORazepam (ATIVAN) injection 1 mg  1 mg Intramuscular Once Arnol Oliver MD   1 mg at 01/06/20 0829   • magnesium hydroxide (MILK OF MAGNESIA) 400 MG/5ML suspension 30 mL  30 mL Oral Daily PRN Miranda Lozano, APRN       • magnesium sulfate 4 gram infusion - Mg less than or equal to 1mg/dL  4 g Intravenous PRN Miranda Lozano APRN        Or   • magnesium sulfate 3 gram infusion (1gm x 3) - Mg 1.1 - 1.5 mg/dL  1 g Intravenous PRN Miranda Lozano APRN 100 mL/hr at 01/07/20 1219 1 g at 01/07/20 1219    Or   •  Magnesium Sulfate 2 gram infusion- Mg 1.6 - 1.9 mg/dL  2 g Intravenous PRN Miranda Lozano APRN       • melatonin tablet 10 mg  10 mg Oral Nightly Collette Leiva MD   10 mg at 20 2200   • melatonin tablet 5 mg  5 mg Oral Nightly PRN Miranda Lozano APREFE       • methadone (DOLOPHINE) tablet 10 mg  10 mg Oral Q8H Miranda Lozano APRN   10 mg at 20 1404   • [COMPLETED] ondansetron (ZOFRAN) injection 4 mg  4 mg Intramuscular Once Arnol Oliver MD   4 mg at 20 0829   • ondansetron (ZOFRAN) tablet 4 mg  4 mg Oral Q6H PRN Miranda Lozano APRN   4 mg at 20 1117   • pantoprazole (PROTONIX) injection 40 mg  40 mg Intravenous Q AM Miranda Lozano APRN   40 mg at 20 0658   • [COMPLETED] perflutren (DEFINITY) 8.476 mg in sodium chloride 0.9 % 10 mL injection  1 mL Intravenous Once Karissa Hoffman, DO   1 mL at 20 0730   • potassium chloride (K-DUR,KLOR-CON) CR tablet 40 mEq  40 mEq Oral PRN Miranda Lozano APRN   40 mEq at 20 1404    Or   • potassium chloride (KLOR-CON) packet 40 mEq  40 mEq Oral PRN Miranda Lozano APRN        Or   • potassium chloride 10 mEq in 100 mL IVPB  10 mEq Intravenous Q1H PRN Miranda Lozano APREFE       • [] potassium chloride (KLOR-CON) 20 MEQ packet  - ADS Override Pull            • potassium chloride (KLOR-CON) packet 40 mEq  40 mEq Oral Once Arnol Oliver MD       • [COMPLETED] potassium chloride 10 mEq in 100 mL IVPB  10 mEq Intravenous Once Arnol Oliver MD   Stopped at 20 1333   • [COMPLETED] potassium chloride 10 mEq in 100 mL IVPB  10 mEq Intravenous Once Arnol Oliver MD   Stopped at 20 1408   • promethazine (PHENERGAN) IV syringe 12.5 mg  12.5 mg Intravenous Q6H PRN Miranda Lozano APRN   12.5 mg at 20 0951   • Scopolamine (TRANSDERM-SCOP) 1.5 MG/3DAYS patch 1 patch  1 patch Transdermal Q72H Miranda Lozano APRN   1 patch at 20 1205   • sodium chloride  0.9 % flush 10 mL  10 mL Intravenous PRN Miranda Lozano, APRN       • sodium chloride 0.9 % flush 10 mL  10 mL Intravenous Q12H Miranda Lozano APRN   10 mL at 01/06/20 2154   • sodium chloride 0.9 % infusion 40 mL  40 mL Intravenous PRN Miranda Lozano APRN       • sodium chloride 0.9% - IBW for BMI > 30 bolus 1,572 mL  30 mL/kg (Ideal) Intravenous PRN Miranda Lozano APREFE       • vancomycin (VANCOCIN) capsule 125 mg  125 mg Oral 4x Daily Calvin Moss MD   125 mg at 01/08/20 1404     Blood Administration Record (From admission, onward)    None        Lab Results (last 48 hours)     Procedure Component Value Units Date/Time    Beta Strep Culture, Throat - Swab, Throat [[id number]]  (Normal) Collected:  01/06/20 0833    Specimen:  Swab from Throat Updated:  01/08/20 1301     Throat Culture, Beta Strep No Beta Hemolytic Streptococcus Isolated    Narrative:       Group A Strep incidence is low in adults. Positive culture for Beta hemolytic Streptococcus species can reflect colonization and not true infection. Please correlate clinically.    C-reactive Protein [[id number]]  (Abnormal) Collected:  01/08/20 0649    Specimen:  Blood Updated:  01/08/20 1107     C-Reactive Protein 13.10 mg/dL     Magnesium [[id number]]  (Normal) Collected:  01/08/20 0649    Specimen:  Blood Updated:  01/08/20 0943     Magnesium 2.1 mg/dL     Potassium [[id number]]  (Normal) Collected:  01/08/20 0605    Specimen:  Blood Updated:  01/08/20 0648     Potassium 3.6 mmol/L     Basic Metabolic Panel [[id number]]  (Abnormal) Collected:  01/08/20 0605    Specimen:  Blood Updated:  01/08/20 0648     Glucose 95 mg/dL      BUN 7 mg/dL      Creatinine 0.94 mg/dL      Sodium 137 mmol/L      Potassium 3.6 mmol/L      Chloride 104 mmol/L      CO2 20.4 mmol/L      Calcium 7.5 mg/dL      eGFR Non African Amer 71 mL/min/1.73      BUN/Creatinine Ratio 7.4     Anion Gap 12.6 mmol/L     Narrative:       GFR Normal >60  Chronic Kidney Disease  <60  Kidney Failure <15      Procalcitonin [[id number]]  (Abnormal) Collected:  01/08/20 0605    Specimen:  Blood Updated:  01/08/20 0637     Procalcitonin 2.23 ng/mL     Lactic Acid, Plasma [[id number]]  (Normal) Collected:  01/08/20 0605    Specimen:  Blood Updated:  01/08/20 0620     Lactate 1.4 mmol/L     CBC & Differential [[id number]] Collected:  01/08/20 0605    Specimen:  Blood Updated:  01/08/20 0608    Narrative:       The following orders were created for panel order CBC & Differential.  Procedure                               Abnormality         Status                     ---------                               -----------         ------                     CBC Auto Differential[[id number]]        Abnormal            Final result                 Please view results for these tests on the individual orders.    CBC Auto Differential [[id number]]  (Abnormal) Collected:  01/08/20 0605    Specimen:  Blood Updated:  01/08/20 0608     WBC 8.85 10*3/mm3      RBC 3.10 10*6/mm3      Hemoglobin 9.2 g/dL      Hematocrit 28.3 %      MCV 91.3 fL      MCH 29.7 pg      MCHC 32.5 g/dL      RDW 16.2 %      RDW-SD 54.8 fl      MPV 11.5 fL      Platelets 115 10*3/mm3      Neutrophil % 71.4 %      Lymphocyte % 15.1 %      Monocyte % 11.9 %      Eosinophil % 0.9 %      Basophil % 0.2 %      Immature Grans % 0.5 %      Neutrophils, Absolute 6.32 10*3/mm3      Lymphocytes, Absolute 1.34 10*3/mm3      Monocytes, Absolute 1.05 10*3/mm3      Eosinophils, Absolute 0.08 10*3/mm3      Basophils, Absolute 0.02 10*3/mm3      Immature Grans, Absolute 0.04 10*3/mm3      nRBC 0.0 /100 WBC     Urine Culture - Urine, Urine, Clean Catch [[id number]]  (Abnormal)  (Susceptibility) Collected:  01/06/20 0833    Specimen:  Urine, Clean Catch Updated:  01/08/20 0318     Urine Culture >100,000 CFU/mL Escherichia coli    Susceptibility      Escherichia coli     DENIA     Ampicillin Susceptible     Ampicillin + Sulbactam Susceptible     Cefazolin Susceptible      Cefepime Susceptible     Ceftazidime Susceptible     Ceftriaxone Susceptible     Gentamicin Susceptible     Levofloxacin Susceptible     Nitrofurantoin Susceptible     Piperacillin + Tazobactam Susceptible     Tetracycline Susceptible     Trimethoprim + Sulfamethoxazole Susceptible                    Blood Culture - Blood, Arm, Right [[id number]] Collected:  01/06/20 2117    Specimen:  Blood from Arm, Right Updated:  01/07/20 2130     Blood Culture No growth at 24 hours    Gastrointestinal Panel, PCR - Stool, Per Rectum [[id number]]  (Normal) Collected:  01/07/20 1823    Specimen:  Stool from Per Rectum Updated:  01/07/20 2109     Campylobacter Not Detected     Plesiomonas shigelloides Not Detected     Salmonella Not Detected     Vibrio Not Detected     Vibrio cholerae Not Detected     Yersinia enterocolitica Not Detected     Enteroaggregative E. coli (EAEC) Not Detected     Enteropathogenic E. coli (EPEC) Not Detected     Enterotoxigenic E. coli (ETEC) lt/st Not Detected     Shiga-like toxin-producing E. coli (STEC) stx1/stx2 Not Detected     E. coli O157 Not Detected     Shigella/Enteroinvasive E. coli (EIEC) Not Detected     Cryptosporidium Not Detected     Cyclospora cayetanensis Not Detected     Entamoeba histolytica Not Detected     Giardia lamblia Not Detected     Adenovirus F40/41 Not Detected     Astrovirus Not Detected     Norovirus GI/GII Not Detected     Rotavirus A Not Detected     Sapovirus (I, II, IV or V) Not Detected    Narrative:       If Aeromonas, Staphylococcus aureus or Bacillus cereus are suspected, please order [id number]: Stool Culture, Aeromonas, S aureus, B Cereus.    Lactic Acid, Plasma [[id number]]  (Normal) Collected:  01/07/20 2023    Specimen:  Blood Updated:  01/07/20 2039     Lactate 1.4 mmol/L     Clostridium Difficile Toxin - Stool, Per Rectum [[id number]] Collected:  01/07/20 1850    Specimen:  Stool from Per Rectum Updated:  01/07/20 2029    Narrative:       The following orders  were created for panel order Clostridium Difficile Toxin - Stool, Per Rectum.  Procedure                               Abnormality         Status                     ---------                               -----------         ------                     Clostridium Difficile EI...[[id number]]  Abnormal            Final result                 Please view results for these tests on the individual orders.    Clostridium Difficile EIA - Stool, Per Rectum [[id number]]  (Abnormal) Collected:  01/07/20 1850    Specimen:  Stool from Per Rectum Updated:  01/07/20 2029     C Diff GDH / Toxin Positive    Blood Culture - Blood, Hand, Right [[id number]] Collected:  01/06/20 1702    Specimen:  Blood from Hand, Right Updated:  01/07/20 1716     Blood Culture No growth at 24 hours    Lipase [[id number]]  (Normal) Collected:  01/07/20 0746    Specimen:  Blood Updated:  01/07/20 1313     Lipase 14 U/L     Amylase [[id number]]  (Abnormal) Collected:  01/07/20 0746    Specimen:  Blood Updated:  01/07/20 1313     Amylase 17 U/L     Magnesium [[id number]]  (Abnormal) Collected:  01/07/20 0746    Specimen:  Blood Updated:  01/07/20 0757     Magnesium 1.3 mg/dL     Basic Metabolic Panel [[id number]]  (Abnormal) Collected:  01/07/20 0506    Specimen:  Blood Updated:  01/07/20 0616     Glucose 122 mg/dL      BUN 10 mg/dL      Creatinine 1.05 mg/dL      Sodium 135 mmol/L      Potassium 3.5 mmol/L      Chloride 99 mmol/L      CO2 22.0 mmol/L      Calcium 7.4 mg/dL      eGFR Non African Amer 62 mL/min/1.73      BUN/Creatinine Ratio 9.5     Anion Gap 14.0 mmol/L     Narrative:       GFR Normal >60  Chronic Kidney Disease <60  Kidney Failure <15      Lactic Acid, Plasma [[id number]]  (Normal) Collected:  01/07/20 0506    Specimen:  Blood Updated:  01/07/20 0612     Lactate 1.5 mmol/L     CBC & Differential [[id number]] Collected:  01/07/20 0506    Specimen:  Blood Updated:  01/07/20 0603    Narrative:       The following orders were created for panel order  CBC & Differential.  Procedure                               Abnormality         Status                     ---------                               -----------         ------                     CBC Auto Differential[[id number]]        Abnormal            Final result                 Please view results for these tests on the individual orders.    CBC Auto Differential [[id number]]  (Abnormal) Collected:  01/07/20 0506    Specimen:  Blood Updated:  01/07/20 0603     WBC 12.34 10*3/mm3      RBC 3.41 10*6/mm3      Hemoglobin 10.1 g/dL      Hematocrit 30.2 %      MCV 88.6 fL      MCH 29.6 pg      MCHC 33.4 g/dL      RDW 15.6 %      RDW-SD 50.7 fl      MPV 11.3 fL      Platelets 116 10*3/mm3      Neutrophil % 79.6 %      Lymphocyte % 7.5 %      Monocyte % 12.2 %      Eosinophil % 0.1 %      Basophil % 0.1 %      Immature Grans % 0.5 %      Neutrophils, Absolute 9.83 10*3/mm3      Lymphocytes, Absolute 0.92 10*3/mm3      Monocytes, Absolute 1.51 10*3/mm3      Eosinophils, Absolute 0.01 10*3/mm3      Basophils, Absolute 0.01 10*3/mm3      Immature Grans, Absolute 0.06 10*3/mm3      nRBC 0.0 /100 WBC     C-reactive Protein [[id number]]  (Abnormal) Collected:  01/06/20 1658    Specimen:  Blood Updated:  01/06/20 1957     C-Reactive Protein 29.41 mg/dL     Lactic Acid, Plasma [[id number]]  (Normal) Collected:  01/06/20 1658    Specimen:  Blood Updated:  01/06/20 1723     Lactate 0.9 mmol/L     Procalcitonin [[id number]]  (Abnormal) Collected:  01/06/20 1111    Specimen:  Blood Updated:  01/06/20 1708     Procalcitonin 5.17 ng/mL     TSH [[id number]]  (Normal) Collected:  01/06/20 1111    Specimen:  Blood Updated:  01/06/20 1704     TSH 0.318 uIU/mL     Hemoglobin A1c [[id number]]  (Normal) Collected:  01/06/20 1111    Specimen:  Blood Updated:  01/06/20 1700     Hemoglobin A1C 5.00 %     Narrative:       Hemoglobin A1C Ranges:    Increased Risk for Diabetes  5.7% to 6.4%  Diabetes                     >= 6.5%  Diabetic Goal                 < 7.0%    Sedimentation Rate [[id number]]  (Abnormal) Collected:  01/06/20 1111    Specimen:  Blood Updated:  01/06/20 1515     Sed Rate 41 mm/hr           Imaging Results (Last 48 Hours)     ** No results found for the last 48 hours. **        ECG/EMG Results (last 48 hours)     Procedure Component Value Units Date/Time    Adult Transthoracic Echo Complete W/ Cont if Necessary Per Protocol [[id number]] Collected:  01/07/20 0717     Updated:  01/07/20 1540     BSA 1.8 m^2      IVSd 1.0 cm      LVIDd 4.3 cm      LVIDs 3.0 cm      LVPWd 1.0 cm      IVS/LVPW 1.0     FS 29.8 %      EDV(Teich) 84.4 ml      ESV(Teich) 36.2 ml      EF(Teich) 57.2 %      EDV(cubed) 81.2 ml      ESV(cubed) 28.1 ml      EF(cubed) 65.4 %      LV mass(C)d 146.1 grams      LV mass(C)dI 81.5 grams/m^2      SV(Teich) 48.3 ml      SI(Teich) 26.9 ml/m^2      SV(cubed) 53.1 ml      SI(cubed) 29.6 ml/m^2      Ao root diam 2.7 cm      Ao root area 5.7 cm^2      ACS 2.1 cm      asc Aorta Diam 2.7 cm      LVOT diam 2.0 cm      LVOT area 3.1 cm^2      LVOT area(traced) 3.1 cm^2      RVOT diam 1.7 cm      RVOT area 2.3 cm^2      LVLd ap4 8.4 cm      EDV(MOD-sp4) 117.0 ml      LVLs ap4 6.8 cm      ESV(MOD-sp4) 46.1 ml      EF(MOD-sp4) 60.6 %      LVLd ap2 8.4 cm      EDV(MOD-sp2) 129.0 ml      LVLs ap2 6.8 cm      ESV(MOD-sp2) 52.0 ml      EF(MOD-sp2) 59.7 %      SV(MOD-sp4) 70.9 ml      SI(MOD-sp4) 39.5 ml/m^2      SV(MOD-sp2) 77.0 ml      SI(MOD-sp2) 42.9 ml/m^2      Ao root area (BSA corrected) 1.5     LV Hansen Vol (BSA corrected) 65.3 ml/m^2      LV Sys Vol (BSA corrected) 25.7 ml/m^2      TAPSE (>1.6) 2.1 cm      MV A dur 0.13 sec      MV E max moraima 97.5 cm/sec      MV A max moraima 48.5 cm/sec      MV E/A 2.0     MV V2 max 100.0 cm/sec      MV max PG 4.0 mmHg      MV V2 mean 62.8 cm/sec      MV mean PG 2.0 mmHg      MV V2 VTI 20.5 cm      MVA(VTI) 3.1 cm^2      MV P1/2t max moraima 109.0 cm/sec      MV P1/2t 73.2 msec      MVA(P1/2t) 3.0 cm^2      MV dec  slope 436.0 cm/sec^2      MV dec time 148 sec      Ao pk darius 130.0 cm/sec      Ao max PG 6.8 mmHg      Ao max PG (full) 1.5 mmHg      Ao V2 mean 86.2 cm/sec      Ao mean PG 4.0 mmHg      Ao mean PG (full) 1.5 mmHg      Ao V2 VTI 23.7 cm      MARCOS(I,A) 2.7 cm^2      MARCOS(I,D) 2.7 cm^2      MARCOS(V,A) 2.8 cm^2      MARCOS(V,D) 2.8 cm^2      LV V1 max PG 5.3 mmHg      LV V1 mean PG 2.5 mmHg      LV V1 max 114.5 cm/sec      LV V1 mean 73.9 cm/sec      LV V1 VTI 20.2 cm      SV(Ao) 135.7 ml      SI(Ao) 75.7 ml/m^2      SV(LVOT) 63.3 ml      SV(RVOT) 29.3 ml      SI(LVOT) 35.3 ml/m^2      PA V2 max 98.9 cm/sec      PA max PG 3.9 mmHg      PA max PG (full) 2.4 mmHg       CV ECHO MARY - PVA(V,A) 1.4 cm^2       CV ECHO MARY - PVA(V,D) 1.4 cm^2      PA acc time 0.1 sec      RV V1 max PG 1.5 mmHg      RV V1 mean PG 1.0 mmHg      RV V1 max 60.6 cm/sec      RV V1 mean 44.8 cm/sec      RV V1 VTI 12.9 cm      TR max darius 278.7 cm/sec      RVSP(TR) 40.1 mmHg      RAP systole 8.0 mmHg      PA pr(Accel) 32.7 mmHg      Qp/Qs 0.46     MVA P1/2T LCG 2.0 cm^2       CV ECHO MARY - BZI_BMI 31.5 kilograms/m^2       CV ECHO MARY - BSA([facility]) 1.9 m^2       CV ECHO MARY - BZI_METRIC_WEIGHT 78.0 kg       CV ECHO MARY - BZI_METRIC_HEIGHT 157.5 cm      Target HR (85%) 163 bpm      Max. Pred. HR (100%) 192 bpm       CV VAS BP RIGHT /68 mmHg      TDI S' 12.00 cm/sec      RV Base 3.70 cm      LA Volume Index 24.0 mL/m2      Avg E/e' ratio 8.48     EF(MOD-bp) 61.0 %      Lat Peak E' Darius 12.0 cm/sec      Med Peak E' Darius 11.00 cm/sec     Narrative:       · Left ventricular systolic function is normal. Calculated EF = 61.0%.   Estimated EF was in agreement with the calculated EF. Normal left   ventricular cavity size and wall thickness noted. All left ventricular   wall segments contract normally. Left ventricular diastolic function is   normal.  · Estimated right ventricular systolic pressure from tricuspid   regurgitation is mildly  elevated (35-45 mmHg). Calculated right   ventricular systolic pressure from tricuspid regurgitation is 40.1 mmHg.             Orders (last 48 hrs)      Start     Ordered    01/09/20 1850  Auto Discontinue in 48 Hours if not Collected  ONCE CDIFF      01/07/20 1850    01/09/20 1127  Auto Discontinue GI Panel in 48 Hours if not Collected  ONCE GI PANEL      01/07/20 1126    01/08/20 1414  Diet Regular  Diet Effective Now      01/08/20 1413    01/08/20 0758  Magnesium  Once      01/08/20 0758    01/08/20 0600  C-reactive Protein  Morning Draw      01/07/20 1240    01/08/20 0600  Procalcitonin  Morning Draw      01/07/20 1240    01/08/20 0600  pantoprazole (PROTONIX) injection 40 mg  Every Early Morning      01/07/20 1241    01/08/20 0600  CBC Auto Differential  PROCEDURE ONCE      01/08/20 0003    01/08/20 0200  Vancomycin, Random  Timed,   Status:  Canceled      01/07/20 1033    01/08/20 0200  Lactic Acid, Plasma  Every 6 Hours,   Status:  Canceled     Comments:  Until normalized      01/08/20 0042    01/07/20 2200  methadone (DOLOPHINE) tablet 10 mg  Every 8 Hours Scheduled      01/07/20 1402    01/07/20 2145  vancomycin (VANCOCIN) capsule 125 mg  4 Times Daily      01/07/20 2056    01/07/20 1850  Clostridium Difficile Toxin - Stool, Per Rectum  Once      01/07/20 1850    01/07/20 1850  Patient Isolation Contact Spore  Continuous     Comments:  Isolation Precautions Per Clostridium Difficile (CDI) Infection Control Policy / Process    01/07/20 1850    01/07/20 1850  Clostridium Difficile EIA - Stool, Per Rectum  PROCEDURE ONCE      01/07/20 1850    01/07/20 1800  ALPRAZolam (XANAX) tablet 0.5 mg  4 Times Daily      01/07/20 1122    01/07/20 1530  Potassium  Timed      01/07/20 1125    01/07/20 1400  methadone (DOLOPHINE) tablet 2.5 mg  Every 8 Hours Scheduled,   Status:  Discontinued      01/07/20 1124    01/07/20 1234  Lipase  Once      01/07/20 1233    01/07/20 1234  Amylase  Once      01/07/20 1233    01/07/20  1215  Scopolamine (TRANSDERM-SCOP) 1.5 MG/3DAYS patch 1 patch  Every 72 Hours      01/07/20 1124    01/07/20 1127  Gastrointestinal Panel, PCR - Stool, Per Rectum  Once      01/07/20 1126    01/07/20 1126  loperamide (IMODIUM) capsule 2 mg  4 Times Daily PRN      01/07/20 1126    01/07/20 1123  Diet Clear Liquid  Diet Effective Now,   Status:  Canceled      01/07/20 1124    01/07/20 1030  perflutren (DEFINITY) 8.476 mg in sodium chloride 0.9 % 10 mL injection  Once      01/07/20 0944    01/07/20 1000  cefTRIAXone (ROCEPHIN) IVPB 2 g/50ml dextrose (premix)  Every 24 Hours      01/06/20 1625    01/07/20 0924  promethazine (PHENERGAN) IV syringe 12.5 mg  Every 6 Hours PRN      01/07/20 0924    01/07/20 0900  FLUoxetine (PROzac) capsule 20 mg  Daily,   Status:  Discontinued      01/06/20 2113 01/07/20 0900  carBAMazepine XR (TEGretol  XR) 12 hr tablet 200 mg  Every 12 Hours Scheduled      01/07/20 0708    01/07/20 0600  CBC & Differential  Daily      01/06/20 1625    01/07/20 0600  Basic Metabolic Panel  Daily      01/06/20 1625    01/07/20 0600  CBC Auto Differential  PROCEDURE ONCE      01/07/20 0003    01/06/20 2200  carBAMazepine (CARBATROL) 12 hr capsule 200 mg  Every 12 Hours Scheduled,   Status:  Discontinued      01/06/20 2113 01/06/20 2200  melatonin tablet 10 mg  Nightly      01/06/20 2113 01/06/20 2200  FLUoxetine (PROzac) capsule 20 mg  Daily      01/06/20 2113 01/06/20 2147  carBAMazepine (TEGretol) 200 MG tablet  - ADS Override Pull     Note to Pharmacy:  Created by cabinet override    01/06/20 2147 01/06/20 2102  Ethanol  Once,   Status:  Canceled      01/06/20 2101 01/06/20 2100  sodium chloride 0.9 % flush 10 mL  Every 12 Hours Scheduled      01/06/20 1625    01/06/20 2028  Blood Culture - Blood,  Once     Comments:  From Different Site Than 1st Blood Culture      01/06/20 1625    01/06/20 2000  Vital Signs  Every 4 Hours      01/06/20 1625    01/06/20 1800  Incentive Spirometry  Every  4 Hours While Awake      01/06/20 1625    01/06/20 1800  Lactic Acid, Plasma  Every 6 Hours,   Status:  Canceled     Comments:  Until normalized      01/06/20 1625    01/06/20 1715  sodium chloride 0.9 % infusion  Continuous,   Status:  Discontinued      01/06/20 1625    01/06/20 1715  famotidine (PEPCID) tablet 40 mg  Daily,   Status:  Discontinued      01/06/20 1625    01/06/20 1626  Intake & Output  Every Shift      01/06/20 1625    01/06/20 1626  Weigh Patient  Once      01/06/20 1625    01/06/20 1626  Insert Peripheral IV  Once      01/06/20 1625    01/06/20 1626  Saline Lock & Maintain IV Access  Continuous      01/06/20 1625    01/06/20 1626  Place Sequential Compression Device  Once      01/06/20 1625    01/06/20 1626  Maintain Sequential Compression Device  Continuous      01/06/20 1625    01/06/20 1626  Pulse Oximetry,  Spot  Once      01/06/20 1625    01/06/20 1626  Strict Intake & Output  Every Shift      01/06/20 1625    01/06/20 1626  Diet Regular  Diet Effective Now,   Status:  Canceled      01/06/20 1625    01/06/20 1626  Procalcitonin  STAT      01/06/20 1625    01/06/20 1626  Lactic Acid, Plasma  STAT     Comments:  Automatic Reflex Lactate Will Occur 3 Hours From Result Time With an Initial Lactate Greater Than 2      01/06/20 1625    01/06/20 1626  TSH  Once      01/06/20 1625    01/06/20 1626  Hemoglobin A1c  Once      01/06/20 1625    01/06/20 1626  Blood Culture - Blood,  Once      01/06/20 1625    01/06/20 1626  Urinalysis With Culture If Indicated -  Once,   Status:  Canceled     Comments:  Please do on specimen in lab      01/06/20 1625    01/06/20 1626  Patient Currently On Electrolyte Replacement Protocol - Please Refer to MAR for Protocol Details  Misc Nursing Order (Specify)  Daily     Comments:  Patient Currently On Electrolyte Replacement Protocol - Please Refer to MAR for Protocol Details    01/06/20 1625    01/06/20 1625  melatonin tablet 5 mg  Nightly PRN      01/06/20 1625     01/06/20 1625  bisacodyl (DULCOLAX) suppository 10 mg  Daily PRN      01/06/20 1625    01/06/20 1625  bisacodyl (DULCOLAX) EC tablet 5 mg  Daily PRN      01/06/20 1625    01/06/20 1625  magnesium hydroxide (MILK OF MAGNESIA) 400 MG/5ML suspension 30 mL  Daily PRN      01/06/20 1625    01/06/20 1625  ondansetron (ZOFRAN) tablet 4 mg  Every 6 Hours PRN      01/06/20 1625    01/06/20 1625  ondansetron (ZOFRAN) injection 4 mg  Every 6 Hours PRN,   Status:  Discontinued      01/06/20 1625    01/06/20 1625  potassium chloride (K-DUR,KLOR-CON) CR tablet 40 mEq  As Needed      01/06/20 1625    01/06/20 1625  potassium chloride (KLOR-CON) packet 40 mEq  As Needed      01/06/20 1625    01/06/20 1625  potassium chloride 10 mEq in 100 mL IVPB  Every 1 Hour PRN      01/06/20 1625    01/06/20 1625  magnesium sulfate 4 gram infusion - Mg less than or equal to 1mg/dL  As Needed      01/06/20 1625    01/06/20 1625  magnesium sulfate 3 gram infusion (1gm x 3) - Mg 1.1 - 1.5 mg/dL  As Needed      01/06/20 1625    01/06/20 1625  Magnesium Sulfate 2 gram infusion- Mg 1.6 - 1.9 mg/dL  As Needed      01/06/20 1625    01/06/20 1625  sodium chloride 0.9 % flush 10 mL  As Needed      01/06/20 1625    01/06/20 1625  sodium chloride 0.9 % infusion 40 mL  As Needed      01/06/20 1625    01/06/20 1625  sodium chloride 0.9% - IBW for BMI > 30 bolus 1,572 mL  As Needed      01/06/20 1625    01/06/20 1543  vancomycin (VANCOCIN) 1,250 mg in sodium chloride 0.9 % 250 mL IVPB  Every 12 Hours,   Status:  Discontinued      01/06/20 1542    01/06/20 1508  acetaminophen (TYLENOL) tablet 1,000 mg  Every 6 Hours PRN      01/06/20 1508    01/06/20 1507  Pharmacy to dose vancomycin  Continuous PRN,   Status:  Discontinued      01/06/20 1507    01/06/20 1507  Seizure Precautions  Continuous      01/06/20 1506    01/06/20 1507  Clinical [place] Withdrawal Assessment  Once      01/06/20 1506    01/06/20 1506  C-reactive Protein  Once     Comments:  Please do  on blood in lab      01/06/20 1505    01/06/20 1505  ALPRAZolam (XANAX) tablet 0.5 mg  Once      01/06/20 1503    01/06/20 1505  Adult Transthoracic Echo Complete W/ Cont if Necessary Per Protocol  Once     Comments:  H/O IVDA, r/o infection    01/06/20 1505    01/06/20 1505  Sedimentation Rate  Once      01/06/20 1505    01/06/20 1503  ALPRAZolam (XANAX) tablet 0.5 mg  4 Times Daily PRN,   Status:  Discontinued      01/06/20 1503    01/06/20 1422  acetaminophen (TYLENOL) tablet 1,000 mg  Once      01/06/20 1420    01/06/20 1416  HYDROmorphone (DILAUDID) injection 0.5 mg  Once,   Status:  Discontinued      01/06/20 1414    01/06/20 1411  cefTRIAXone (ROCEPHIN) IVPB 1 g/50ml dextrose (premix)  Once      01/06/20 1410    01/06/20 1155  potassium chloride (KLOR-CON) packet 40 mEq  Once      01/06/20 1153    Unscheduled  Oxygen Therapy- Nasal Cannula; Titrate for SPO2: 90% - 95%  Continuous PRN      01/06/20 1625    Unscheduled  Up With Assistance  As Needed      01/06/20 1625    Unscheduled  Follow Acute Urinary Retention Protocol  As Needed      01/06/20 1625    Unscheduled  Magnesium  As Needed      01/06/20 1625    Unscheduled  Potassium  As Needed      01/06/20 1625    --  ALPRAZolam (XANAX) 1 MG tablet  2 Times Daily      01/06/20 0748    --  FLUoxetine (PROzac) 20 MG capsule  Daily      01/06/20 0748    --  methadone (DOLOPHINE) 10 MG tablet  Every 6 Hours PRN,   Status:  Discontinued      01/06/20 0748              Ventilator/Non-Invasive Ventilation Settings (From admission, onward)    None        Operative/Procedure Notes (last 48 hours) (Notes from 01/06/20 1500 through 01/08/20 1500)    No notes of this type exist for this encounter.          Physician Progress Notes (last 48 hours) (Notes from 01/06/20 1500 through 01/08/20 1500)      Miranda Lozano, APRN at 01/08/20 0740          SERVICE: [facility] HOSPITALIST    CHIEF COMPLAINT: Follow-up pyelonephritis, C. difficile colitis, drug  "withdrawal    SUBJECTIVE: Patient reports she is feeling tremendously better, hungry with 1 or 2 bowel movements in the past 12 to 16 hours.  She has had no further nausea or vomiting. She reports pain is well controlled.  She requests provider that can taper methadone as she is new here from [place].  She otherwise denies f/c/cough/soa/chest pain/n/v/d/abdominal pain or other new concerns.    OBJECTIVE:    /69 (BP Location: Right arm, Patient Position: Lying)   Pulse 92   Temp 99.4 °F (37.4 °C) (Axillary)   Resp 16   Ht 157.5 cm (62\")   Wt 78 kg (172 lb)   LMP 12/30/2019   SpO2 95% Comment: after doing pursed lip breathing, RN informed  BMI 31.46 kg/m²      MEDS/LABS REVIEWED AND ORDERED    ALPRAZolam 0.5 mg Oral 4x Daily   carBAMazepine  mg Oral Q12H   cefTRIAXone 2 g Intravenous Q24H   FLUoxetine 20 mg Oral Daily   melatonin 10 mg Oral Nightly   methadone 10 mg Oral Q8H   pantoprazole 40 mg Intravenous Q AM   potassium chloride 40 mEq Oral Once   Scopolamine 1 patch Transdermal Q72H   sodium chloride 10 mL Intravenous Q12H   vancomycin 125 mg Oral 4x Daily     Physical Exam   Constitutional: She is oriented to person, place, and time. She appears well-developed and well-nourished.   Obese   HENT:   Head: Normocephalic and atraumatic.   Eyes: Pupils are equal, round, and reactive to light. EOM are normal.   Cardiovascular: Normal rate and regular rhythm.   Pulmonary/Chest: Effort normal and breath sounds normal. No stridor. No respiratory distress. She has no wheezes.   Abdominal: Soft. Bowel sounds are normal. She exhibits no distension. There is no tenderness. There is no guarding.   Musculoskeletal: She exhibits no edema.   Neurological: She is alert and oriented to person, place, and time.   Skin: Skin is warm and dry. No erythema.   Tattooing of skin noted   Psychiatric: She has a normal mood and affect. Her behavior is normal.   Vitals reviewed.    LAB/DIAGNOSTICS:    Lab Results (last 24 " hours)     Procedure Component Value Units Date/Time    Beta Strep Culture, Throat - Swab, Throat [[id number]]  (Normal) Collected:  01/06/20 0833    Specimen:  Swab from Throat Updated:  01/08/20 1301     Throat Culture, Beta Strep No Beta Hemolytic Streptococcus Isolated    Narrative:       Group A Strep incidence is low in adults. Positive culture for Beta hemolytic Streptococcus species can reflect colonization and not true infection. Please correlate clinically.    C-reactive Protein [[id number]]  (Abnormal) Collected:  01/08/20 0649    Specimen:  Blood Updated:  01/08/20 1107     C-Reactive Protein 13.10 mg/dL     Magnesium [[id number]]  (Normal) Collected:  01/08/20 0649    Specimen:  Blood Updated:  01/08/20 0943     Magnesium 2.1 mg/dL     Potassium [[id number]]  (Normal) Collected:  01/08/20 0605    Specimen:  Blood Updated:  01/08/20 0648     Potassium 3.6 mmol/L     Basic Metabolic Panel [[id number]]  (Abnormal) Collected:  01/08/20 0605    Specimen:  Blood Updated:  01/08/20 0648     Glucose 95 mg/dL      BUN 7 mg/dL      Creatinine 0.94 mg/dL      Sodium 137 mmol/L      Potassium 3.6 mmol/L      Chloride 104 mmol/L      CO2 20.4 mmol/L      Calcium 7.5 mg/dL      eGFR Non African Amer 71 mL/min/1.73      BUN/Creatinine Ratio 7.4     Anion Gap 12.6 mmol/L     Narrative:       GFR Normal >60  Chronic Kidney Disease <60  Kidney Failure <15      Procalcitonin [[id number]]  (Abnormal) Collected:  01/08/20 0605    Specimen:  Blood Updated:  01/08/20 0637     Procalcitonin 2.23 ng/mL     Lactic Acid, Plasma [[id number]]  (Normal) Collected:  01/08/20 0605    Specimen:  Blood Updated:  01/08/20 0620     Lactate 1.4 mmol/L     CBC & Differential [[id number]] Collected:  01/08/20 0605    Specimen:  Blood Updated:  01/08/20 0608    Narrative:       The following orders were created for panel order CBC & Differential.  Procedure                               Abnormality         Status                     ---------                                -----------         ------                     CBC Auto Differential[[id number]]        Abnormal            Final result                 Please view results for these tests on the individual orders.    CBC Auto Differential [[id number]]  (Abnormal) Collected:  01/08/20 0605    Specimen:  Blood Updated:  01/08/20 0608     WBC 8.85 10*3/mm3      RBC 3.10 10*6/mm3      Hemoglobin 9.2 g/dL      Hematocrit 28.3 %      MCV 91.3 fL      MCH 29.7 pg      MCHC 32.5 g/dL      RDW 16.2 %      RDW-SD 54.8 fl      MPV 11.5 fL      Platelets 115 10*3/mm3      Neutrophil % 71.4 %      Lymphocyte % 15.1 %      Monocyte % 11.9 %      Eosinophil % 0.9 %      Basophil % 0.2 %      Immature Grans % 0.5 %      Neutrophils, Absolute 6.32 10*3/mm3      Lymphocytes, Absolute 1.34 10*3/mm3      Monocytes, Absolute 1.05 10*3/mm3      Eosinophils, Absolute 0.08 10*3/mm3      Basophils, Absolute 0.02 10*3/mm3      Immature Grans, Absolute 0.04 10*3/mm3      nRBC 0.0 /100 WBC     Urine Culture - Urine, Urine, Clean Catch [[id number]]  (Abnormal)  (Susceptibility) Collected:  01/06/20 0833    Specimen:  Urine, Clean Catch Updated:  01/08/20 0318     Urine Culture >100,000 CFU/mL Escherichia coli    Susceptibility      Escherichia coli     DENIA     Ampicillin Susceptible     Ampicillin + Sulbactam Susceptible     Cefazolin Susceptible     Cefepime Susceptible     Ceftazidime Susceptible     Ceftriaxone Susceptible     Gentamicin Susceptible     Levofloxacin Susceptible     Nitrofurantoin Susceptible     Piperacillin + Tazobactam Susceptible     Tetracycline Susceptible     Trimethoprim + Sulfamethoxazole Susceptible                    Blood Culture - Blood, Arm, Right [[id number]] Collected:  01/06/20 2117    Specimen:  Blood from Arm, Right Updated:  01/07/20 2130     Blood Culture No growth at 24 hours    Gastrointestinal Panel, PCR - Stool, Per Rectum [[id number]]  (Normal) Collected:  01/07/20 1823    Specimen:  Stool from Per  Rectum Updated:  01/07/20 2109     Campylobacter Not Detected     Plesiomonas shigelloides Not Detected     Salmonella Not Detected     Vibrio Not Detected     Vibrio cholerae Not Detected     Yersinia enterocolitica Not Detected     Enteroaggregative E. coli (EAEC) Not Detected     Enteropathogenic E. coli (EPEC) Not Detected     Enterotoxigenic E. coli (ETEC) lt/st Not Detected     Shiga-like toxin-producing E. coli (STEC) stx1/stx2 Not Detected     E. coli O157 Not Detected     Shigella/Enteroinvasive E. coli (EIEC) Not Detected     Cryptosporidium Not Detected     Cyclospora cayetanensis Not Detected     Entamoeba histolytica Not Detected     Giardia lamblia Not Detected     Adenovirus F40/41 Not Detected     Astrovirus Not Detected     Norovirus GI/GII Not Detected     Rotavirus A Not Detected     Sapovirus (I, II, IV or V) Not Detected    Narrative:       If Aeromonas, Staphylococcus aureus or Bacillus cereus are suspected, please order [id number]: Stool Culture, Aeromonas, S aureus, B Cereus.    Lactic Acid, Plasma [[id number]]  (Normal) Collected:  01/07/20 2023    Specimen:  Blood Updated:  01/07/20 2039     Lactate 1.4 mmol/L     Clostridium Difficile Toxin - Stool, Per Rectum [[id number]] Collected:  01/07/20 1850    Specimen:  Stool from Per Rectum Updated:  01/07/20 2029    Narrative:       The following orders were created for panel order Clostridium Difficile Toxin - Stool, Per Rectum.  Procedure                               Abnormality         Status                     ---------                               -----------         ------                     Clostridium Difficile EI...[[id number]]  Abnormal            Final result                 Please view results for these tests on the individual orders.    Clostridium Difficile EIA - Stool, Per Rectum [[id number]]  (Abnormal) Collected:  01/07/20 1850    Specimen:  Stool from Per Rectum Updated:  01/07/20 2029     C Diff GDH / Toxin Positive    Blood  Culture - Blood, Hand, Right [[id number]] Collected:  01/06/20 1702    Specimen:  Blood from Hand, Right Updated:  01/07/20 1716     Blood Culture No growth at 24 hours        No orders to display     Results for orders placed during the hospital encounter of 01/06/20   Adult Transthoracic Echo Complete W/ Cont if Necessary Per Protocol    Narrative · Left ventricular systolic function is normal. Calculated EF = 61.0%.   Estimated EF was in agreement with the calculated EF. Normal left   ventricular cavity size and wall thickness noted. All left ventricular   wall segments contract normally. Left ventricular diastolic function is   normal.  · Estimated right ventricular systolic pressure from tricuspid   regurgitation is mildly elevated (35-45 mmHg). Calculated right   ventricular systolic pressure from tricuspid regurgitation is 40.1 mmHg.        No radiology results for the last day  ASSESSMENT/PLAN:  Sepsis 2/2 acute right E. coli pyelonephritis/acute CDC:  History of skin abscesses:  Ruled out pancreatitis:  Continues Rocephin, with oral vancomycin  Now tolerating clear liquids, advance to regular diet, monitor  DC IV fluids     Acute drug withdrawal:  Previously on methadone prior to [place], started methadone 10 mg every 8 hours, continue Xanax 0.5 mg every 6 hours and wean both as tolerated  Continue Tegretol  Continue CIWA, fall precautions, seizure precautions     Acute kidney injury: Resolved  Electrolyte imbalance:  Creatinine now 0.94, continue IV hydration and electrolyte replacement protocols     Anemia/Thrombocytopenia:  Likely 2/2 acute illness, hemoglobin 9.2, platelets 115,000 without active blood loss noted, continue to hold Lovenox, monitor and recheck in a.m.     Tachycardia: 2/2 sepsis  Echo showed normal EF, mildly elevated RVSP  Likely needs outpatient sleep study after discharge    PLAN FOR DISPOSITION: Home when able, will need PCP and methadone clinic provider    Miranda Lozano  "LESLIE  H[facility]ist, [facility]  01/08/20  7:40 AM    \"Dictated utilizing Dragon dictation\"      Electronically signed by Miranda Lozano APRN at 01/08/20 1417     Miranda Lozano APRN at 01/07/20 1402        Contacted by staff regarding patient reporting methadone dose was 30 mg prior to [place], change to 10 mg 3 times daily    Electronically signed by Miranad Lozano APRN at 01/07/20 1402     Miranda Lozano APRN at 01/07/20 1125          SERVICE: [facility] HOSPITALIST    CONSULTANTS:    CHIEF COMPLAINT: Follow-up sepsis secondary to pyelonephritis, drug withdrawal    SUBJECTIVE: The patient reports she is feeling awful and is having pain all over.  Staff notes gagging but no vomiting and patient reports severe nausea not responding to Zofran.  She has been unable to eat or drink.  She notes she is also having loose stools approximately 4 episodes overnight.  She has no urinary complaints.  Staff notes fevers overnight, responded well to Tylenol.    OBJECTIVE:    /95 (BP Location: Right arm, Patient Position: Lying)   Pulse 104   Temp 98.3 °F (36.8 °C) (Oral)   Resp 16   Ht 157.5 cm (62\")   Wt 78 kg (172 lb)   LMP 12/30/2019   SpO2 100%   BMI 31.46 kg/m²      MEDS/LABS REVIEWED AND ORDERED    ALPRAZolam 0.5 mg Oral 4x Daily   carBAMazepine  mg Oral Q12H   cefTRIAXone 2 g Intravenous Q24H   FLUoxetine 20 mg Oral Daily   melatonin 10 mg Oral Nightly   methadone 2.5 mg Oral Q8H   [START ON 1/8/2020] pantoprazole 40 mg Intravenous Q AM   potassium chloride 40 mEq Oral Once   Scopolamine 1 patch Transdermal Q72H   sodium chloride 10 mL Intravenous Q12H     Physical Exam   Constitutional: She is oriented to person, place, and time. She appears well-developed and well-nourished.   Ill appearance   HENT:   Head: Normocephalic and atraumatic.   Eyes: Pupils are equal, round, and reactive to light. EOM are normal.   Cardiovascular:   Tachycardic, regular "   Pulmonary/Chest: Effort normal and breath sounds normal. No stridor. No respiratory distress. She has no wheezes.   Abdominal: Soft. Bowel sounds are normal. She exhibits no distension. There is no guarding.   Generalized tenderness all quadrants   Musculoskeletal: She exhibits no edema.   Neurological: She is alert and oriented to person, place, and time.   Skin: Skin is warm and dry. No erythema.   Tattooing of skin noted   Psychiatric:   Calm   Vitals reviewed.    LAB/DIAGNOSTICS:    Lab Results (last 24 hours)     Procedure Component Value Units Date/Time    Urine Culture - Urine, Urine, Clean Catch [[id number]]  (Abnormal) Collected:  01/06/20 0833    Specimen:  Urine, Clean Catch Updated:  01/07/20 0840     Urine Culture >100,000 CFU/mL Escherichia coli    Magnesium [[id number]]  (Abnormal) Collected:  01/07/20 0746    Specimen:  Blood Updated:  01/07/20 0757     Magnesium 1.3 mg/dL     Basic Metabolic Panel [[id number]]  (Abnormal) Collected:  01/07/20 0506    Specimen:  Blood Updated:  01/07/20 0616     Glucose 122 mg/dL      BUN 10 mg/dL      Creatinine 1.05 mg/dL      Sodium 135 mmol/L      Potassium 3.5 mmol/L      Chloride 99 mmol/L      CO2 22.0 mmol/L      Calcium 7.4 mg/dL      eGFR Non African Amer 62 mL/min/1.73      BUN/Creatinine Ratio 9.5     Anion Gap 14.0 mmol/L     Narrative:       GFR Normal >60  Chronic Kidney Disease <60  Kidney Failure <15      Lactic Acid, Plasma [[id number]]  (Normal) Collected:  01/07/20 0506    Specimen:  Blood Updated:  01/07/20 0612     Lactate 1.5 mmol/L     CBC & Differential [[id number]] Collected:  01/07/20 0506    Specimen:  Blood Updated:  01/07/20 0603    Narrative:       The following orders were created for panel order CBC & Differential.  Procedure                               Abnormality         Status                     ---------                               -----------         ------                     CBC Auto Differential[[id number]]        Abnormal             Final result                 Please view results for these tests on the individual orders.    CBC Auto Differential [[id number]]  (Abnormal) Collected:  01/07/20 0506    Specimen:  Blood Updated:  01/07/20 0603     WBC 12.34 10*3/mm3      RBC 3.41 10*6/mm3      Hemoglobin 10.1 g/dL      Hematocrit 30.2 %      MCV 88.6 fL      MCH 29.6 pg      MCHC 33.4 g/dL      RDW 15.6 %      RDW-SD 50.7 fl      MPV 11.3 fL      Platelets 116 10*3/mm3      Neutrophil % 79.6 %      Lymphocyte % 7.5 %      Monocyte % 12.2 %      Eosinophil % 0.1 %      Basophil % 0.1 %      Immature Grans % 0.5 %      Neutrophils, Absolute 9.83 10*3/mm3      Lymphocytes, Absolute 0.92 10*3/mm3      Monocytes, Absolute 1.51 10*3/mm3      Eosinophils, Absolute 0.01 10*3/mm3      Basophils, Absolute 0.01 10*3/mm3      Immature Grans, Absolute 0.06 10*3/mm3      nRBC 0.0 /100 WBC     Blood Culture - Blood, Arm, Right [[id number]] Collected:  01/06/20 2117    Specimen:  Blood from Arm, Right Updated:  01/06/20 2118    C-reactive Protein [[id number]]  (Abnormal) Collected:  01/06/20 1658    Specimen:  Blood Updated:  01/06/20 1957     C-Reactive Protein 29.41 mg/dL     Lactic Acid, Plasma [[id number]]  (Normal) Collected:  01/06/20 1658    Specimen:  Blood Updated:  01/06/20 1723     Lactate 0.9 mmol/L     Procalcitonin [[id number]]  (Abnormal) Collected:  01/06/20 1111    Specimen:  Blood Updated:  01/06/20 1708     Procalcitonin 5.17 ng/mL     TSH [[id number]]  (Normal) Collected:  01/06/20 1111    Specimen:  Blood Updated:  01/06/20 1704     TSH 0.318 uIU/mL     Blood Culture - Blood, Hand, Right [[id number]] Collected:  01/06/20 1702    Specimen:  Blood from Hand, Right Updated:  01/06/20 1702    Hemoglobin A1c [[id number]]  (Normal) Collected:  01/06/20 1111    Specimen:  Blood Updated:  01/06/20 1700     Hemoglobin A1C 5.00 %     Narrative:       Hemoglobin A1C Ranges:    Increased Risk for Diabetes  5.7% to 6.4%  Diabetes                     >=  "6.5%  Diabetic Goal                < 7.0%    Sedimentation Rate [[id number]]  (Abnormal) Collected:  01/06/20 1111    Specimen:  Blood Updated:  01/06/20 1515     Sed Rate 41 mm/hr         No orders to display        Ct Abdomen Pelvis Without Contrast    Result Date: 1/6/2020  There is a subtle findings that may represent edema in the right kidney. Correlation with urinalysis recommended to check for possible pyelonephritis.. No stones are identified. There is no evidence of bowel obstruction  Otherwise study is normal except for mild prominence of the liver  This report was finalized on 1/6/2020 2:00 PM by Dr. Td Lindo MD.      ASSESSMENT/PLAN:  Sepsis 2/2 acute right E. coli pyelonephritis:  History of skin abscesses:  Ruled out pancreatitis:  CT abdomen/pelvis report noted  Continue Rocephin 2 g daily, discontinue vancomycin  Lipase low, repeat amylase/lipase pending now  Sed rate, CRP, procalcitonin all elevated, trend  Lactate normal  Monitor closely    Acute drug withdrawal:  Patient denies methadone and benzodiazepine use in the past 2 weeks however UDS positive for both  Feel this is likely withdrawal from both medications, schedule Xanax 0.5 mg every 6 hours and methadone 2.5 mg every 8 hours and monitor for effect  Started on Tegretol per overnight attending  CIWA and seizure, fall precautions in place    Acute kidney injury:  Electrolyte imbalance:  Continue IV hydration, creatinine 1.05, similar to yesterday  Electrolyte replacement protocols in place    Anemia/Thrombocytopenia:  No active blood loss noted, holding Lovenox for now    Tachycardia: Likely secondary to sepsis, echo pending    PLAN FOR DISPOSITION: Home when able    LESLIE Kendall  Hospitalist, [facility]  01/07/20  11:25 AM    \"Dictated utilizing Dragon dictation\"      Electronically signed by Miranda Lozano APRN at 01/07/20 8013     Collette Leiva MD at 01/06/20 6265        Contacted by nurses about pt " showing signs of 'WD'.    Pt denies drinking, but has a history of moving from another state days ago, and is here being managed for infx.     She reports taking 1mg twice a day. We have ordered 0.5mg Q8H PRN, which should keep her from having a seizure, if she is being honest about her use.    Will cover will carbazipine as well per recommendations on dealing with benzo wd.     BP is currently on the low side. will monitor, but would expect opioid and benzo wd to elevate bp pressure.   Lactate was normal, so do not think that sepsis is causing her BP to be much lower than one would expect in WD.    Though will not give clonidine for Opioid wd symptoms.       Electronically signed by Collette Leiva MD at 01/06/20 6849       Consult Notes (last 48 hours) (Notes from 01/06/20 1500 through 01/08/20 1500)    No notes of this type exist for this encounter.

## 2020-01-08 NOTE — PROGRESS NOTES
"SERVICE: [facility] HOSPITALIST    CHIEF COMPLAINT: Follow-up pyelonephritis, C. difficile colitis, drug withdrawal    SUBJECTIVE: Patient reports she is feeling tremendously better, hungry with 1 or 2 bowel movements in the past 12 to 16 hours.  She has had no further nausea or vomiting. She reports pain is well controlled.  She requests provider that can taper methadone as she is new here from [place].  She otherwise denies f/c/cough/soa/chest pain/n/v/d/abdominal pain or other new concerns.    OBJECTIVE:    /69 (BP Location: Right arm, Patient Position: Lying)   Pulse 92   Temp 99.4 °F (37.4 °C) (Axillary)   Resp 16   Ht 157.5 cm (62\")   Wt 78 kg (172 lb)   LMP 12/30/2019   SpO2 95% Comment: after doing pursed lip breathing, RN informed  BMI 31.46 kg/m²     MEDS/LABS REVIEWED AND ORDERED    ALPRAZolam 0.5 mg Oral 4x Daily   carBAMazepine  mg Oral Q12H   cefTRIAXone 2 g Intravenous Q24H   FLUoxetine 20 mg Oral Daily   melatonin 10 mg Oral Nightly   methadone 10 mg Oral Q8H   pantoprazole 40 mg Intravenous Q AM   potassium chloride 40 mEq Oral Once   Scopolamine 1 patch Transdermal Q72H   sodium chloride 10 mL Intravenous Q12H   vancomycin 125 mg Oral 4x Daily     Physical Exam   Constitutional: She is oriented to person, place, and time. She appears well-developed and well-nourished.   Obese   HENT:   Head: Normocephalic and atraumatic.   Eyes: Pupils are equal, round, and reactive to light. EOM are normal.   Cardiovascular: Normal rate and regular rhythm.   Pulmonary/Chest: Effort normal and breath sounds normal. No stridor. No respiratory distress. She has no wheezes.   Abdominal: Soft. Bowel sounds are normal. She exhibits no distension. There is no tenderness. There is no guarding.   Musculoskeletal: She exhibits no edema.   Neurological: She is alert and oriented to person, place, and time.   Skin: Skin is warm and dry. No erythema.   Tattooing of skin noted   "   Psychiatric: She has a normal mood and affect. Her behavior is normal.   Vitals reviewed.    LAB/DIAGNOSTICS:    Lab Results (last 24 hours)     Procedure Component Value Units Date/Time    Beta Strep Culture, Throat - Swab, Throat [[id number]]  (Normal) Collected:  01/06/20 0833    Specimen:  Swab from Throat Updated:  01/08/20 1301     Throat Culture, Beta Strep No Beta Hemolytic Streptococcus Isolated    Narrative:       Group A Strep incidence is low in adults. Positive culture for Beta hemolytic Streptococcus species can reflect colonization and not true infection. Please correlate clinically.    C-reactive Protein [[id number]]  (Abnormal) Collected:  01/08/20 0649    Specimen:  Blood Updated:  01/08/20 1107     C-Reactive Protein 13.10 mg/dL     Magnesium [[id number]]  (Normal) Collected:  01/08/20 0649    Specimen:  Blood Updated:  01/08/20 0943     Magnesium 2.1 mg/dL     Potassium [[id number]]  (Normal) Collected:  01/08/20 0605    Specimen:  Blood Updated:  01/08/20 0648     Potassium 3.6 mmol/L     Basic Metabolic Panel [[id number]]  (Abnormal) Collected:  01/08/20 0605    Specimen:  Blood Updated:  01/08/20 0648     Glucose 95 mg/dL      BUN 7 mg/dL      Creatinine 0.94 mg/dL      Sodium 137 mmol/L      Potassium 3.6 mmol/L      Chloride 104 mmol/L      CO2 20.4 mmol/L      Calcium 7.5 mg/dL      eGFR Non African Amer 71 mL/min/1.73      BUN/Creatinine Ratio 7.4     Anion Gap 12.6 mmol/L     Narrative:       GFR Normal >60  Chronic Kidney Disease <60  Kidney Failure <15      Procalcitonin [[id number]]  (Abnormal) Collected:  01/08/20 0605    Specimen:  Blood Updated:  01/08/20 0637     Procalcitonin 2.23 ng/mL     Lactic Acid, Plasma [[id number]]  (Normal) Collected:  01/08/20 0605    Specimen:  Blood Updated:  01/08/20 0620     Lactate 1.4 mmol/L     CBC & Differential [[id number]] Collected:  01/08/20 0605    Specimen:  Blood Updated:  01/08/20 0608    Narrative:       The following orders were created for  panel order CBC & Differential.  Procedure                               Abnormality         Status                     ---------                               -----------         ------                     CBC Auto Differential[[id number]]        Abnormal            Final result                 Please view results for these tests on the individual orders.    CBC Auto Differential [[id number]]  (Abnormal) Collected:  01/08/20 0605    Specimen:  Blood Updated:  01/08/20 0608     WBC 8.85 10*3/mm3      RBC 3.10 10*6/mm3      Hemoglobin 9.2 g/dL      Hematocrit 28.3 %      MCV 91.3 fL      MCH 29.7 pg      MCHC 32.5 g/dL      RDW 16.2 %      RDW-SD 54.8 fl      MPV 11.5 fL      Platelets 115 10*3/mm3      Neutrophil % 71.4 %      Lymphocyte % 15.1 %      Monocyte % 11.9 %      Eosinophil % 0.9 %      Basophil % 0.2 %      Immature Grans % 0.5 %      Neutrophils, Absolute 6.32 10*3/mm3      Lymphocytes, Absolute 1.34 10*3/mm3      Monocytes, Absolute 1.05 10*3/mm3      Eosinophils, Absolute 0.08 10*3/mm3      Basophils, Absolute 0.02 10*3/mm3      Immature Grans, Absolute 0.04 10*3/mm3      nRBC 0.0 /100 WBC     Urine Culture - Urine, Urine, Clean Catch [[id number]]  (Abnormal)  (Susceptibility) Collected:  01/06/20 0833    Specimen:  Urine, Clean Catch Updated:  01/08/20 0318     Urine Culture >100,000 CFU/mL Escherichia coli    Susceptibility      Escherichia coli     DENIA     Ampicillin Susceptible     Ampicillin + Sulbactam Susceptible     Cefazolin Susceptible     Cefepime Susceptible     Ceftazidime Susceptible     Ceftriaxone Susceptible     Gentamicin Susceptible     Levofloxacin Susceptible     Nitrofurantoin Susceptible     Piperacillin + Tazobactam Susceptible     Tetracycline Susceptible     Trimethoprim + Sulfamethoxazole Susceptible                    Blood Culture - Blood, Arm, Right [[id number]] Collected:  01/06/20 2117    Specimen:  Blood from Arm, Right Updated:  01/07/20 2130     Blood Culture No growth  at 24 hours    Gastrointestinal Panel, PCR - Stool, Per Rectum [[id number]]  (Normal) Collected:  01/07/20 1823    Specimen:  Stool from Per Rectum Updated:  01/07/20 2109     Campylobacter Not Detected     Plesiomonas shigelloides Not Detected     Salmonella Not Detected     Vibrio Not Detected     Vibrio cholerae Not Detected     Yersinia enterocolitica Not Detected     Enteroaggregative E. coli (EAEC) Not Detected     Enteropathogenic E. coli (EPEC) Not Detected     Enterotoxigenic E. coli (ETEC) lt/st Not Detected     Shiga-like toxin-producing E. coli (STEC) stx1/stx2 Not Detected     E. coli O157 Not Detected     Shigella/Enteroinvasive E. coli (EIEC) Not Detected     Cryptosporidium Not Detected     Cyclospora cayetanensis Not Detected     Entamoeba histolytica Not Detected     Giardia lamblia Not Detected     Adenovirus F40/41 Not Detected     Astrovirus Not Detected     Norovirus GI/GII Not Detected     Rotavirus A Not Detected     Sapovirus (I, II, IV or V) Not Detected    Narrative:       If Aeromonas, Staphylococcus aureus or Bacillus cereus are suspected, please order [id number]: Stool Culture, Aeromonas, S aureus, B Cereus.    Lactic Acid, Plasma [[id number]]  (Normal) Collected:  01/07/20 2023    Specimen:  Blood Updated:  01/07/20 2039     Lactate 1.4 mmol/L     Clostridium Difficile Toxin - Stool, Per Rectum [[id number]] Collected:  01/07/20 1850    Specimen:  Stool from Per Rectum Updated:  01/07/20 2029    Narrative:       The following orders were created for panel order Clostridium Difficile Toxin - Stool, Per Rectum.  Procedure                               Abnormality         Status                     ---------                               -----------         ------                     Clostridium Difficile EI...[[id number]]  Abnormal            Final result                 Please view results for these tests on the individual orders.    Clostridium Difficile EIA - Stool, Per Rectum [[id number]]   (Abnormal) Collected:  01/07/20 1850    Specimen:  Stool from Per Rectum Updated:  01/07/20 2029     C Diff GDH / Toxin Positive    Blood Culture - Blood, Hand, Right [[id number]] Collected:  01/06/20 1702    Specimen:  Blood from Hand, Right Updated:  01/07/20 1716     Blood Culture No growth at 24 hours        No orders to display     Results for orders placed during the hospital encounter of 01/06/20   Adult Transthoracic Echo Complete W/ Cont if Necessary Per Protocol    Narrative · Left ventricular systolic function is normal. Calculated EF = 61.0%.   Estimated EF was in agreement with the calculated EF. Normal left   ventricular cavity size and wall thickness noted. All left ventricular   wall segments contract normally. Left ventricular diastolic function is   normal.  · Estimated right ventricular systolic pressure from tricuspid   regurgitation is mildly elevated (35-45 mmHg). Calculated right   ventricular systolic pressure from tricuspid regurgitation is 40.1 mmHg.        No radiology results for the last day  ASSESSMENT/PLAN:  Sepsis 2/2 acute right E. coli pyelonephritis/acute CDC:  History of skin abscesses:  Ruled out pancreatitis:  Continues Rocephin, with oral vancomycin  Now tolerating clear liquids, advance to regular diet, monitor  DC IV fluids     Acute drug withdrawal:  Previously on methadone prior to [place], started methadone 10 mg every 8 hours, continue Xanax 0.5 mg every 6 hours and wean both as tolerated  Continue Tegretol  Continue CIWA, fall precautions, seizure precautions     Acute kidney injury: Resolved  Electrolyte imbalance:  Creatinine now 0.94, continue IV hydration and electrolyte replacement protocols     Anemia/Thrombocytopenia:  Likely 2/2 acute illness, hemoglobin 9.2, platelets 115,000 without active blood loss noted, continue to hold Lovenox, monitor and recheck in a.m.     Tachycardia: 2/2 sepsis  Echo showed normal EF, mildly elevated RVSP  Likely needs outpatient  "sleep study after discharge    PLAN FOR DISPOSITION: Home when able, will need PCP and methadone clinic provider    LESLIE Kendall  Hospitalist, [facility]  01/08/20  7:40 AM    \"Dictated utilizing Dragon dictation\"    "

## 2020-01-08 NOTE — PLAN OF CARE
GI panel ordered - stool collected - awaiting results. Oral K+, IV magnesium given for replacement. Pt afebrile throughout day. Pt has s/s of withdrawal - per note and pt report she has hx of methadone maintenance and benzo use/abuse - most notably elevated pulse, bone/joint aches, GI upset, and anxiety. Scheduled xanax ordered, along with methadone. Pt had dry heaving and nausea this AM - phergan and scopolamine patch ordered. Pt reported relief, and is tolerating clear diet. Will continue to monitor and assess.

## 2020-01-09 LAB
ANION GAP SERPL CALCULATED.3IONS-SCNC: 13.3 MMOL/L (ref 5–15)
BASOPHILS # BLD AUTO: 0.03 10*3/MM3 (ref 0–0.2)
BASOPHILS NFR BLD AUTO: 0.3 % (ref 0–1.5)
BUN BLD-MCNC: 7 MG/DL (ref 6–20)
BUN/CREAT SERPL: 7.3 (ref 7–25)
CALCIUM SPEC-SCNC: 7.8 MG/DL (ref 8.6–10.5)
CHLORIDE SERPL-SCNC: 102 MMOL/L (ref 98–107)
CO2 SERPL-SCNC: 20.7 MMOL/L (ref 22–29)
CREAT BLD-MCNC: 0.96 MG/DL (ref 0.57–1)
DEPRECATED RDW RBC AUTO: 55.9 FL (ref 37–54)
EOSINOPHIL # BLD AUTO: 0.15 10*3/MM3 (ref 0–0.4)
EOSINOPHIL NFR BLD AUTO: 1.5 % (ref 0.3–6.2)
ERYTHROCYTE [DISTWIDTH] IN BLOOD BY AUTOMATED COUNT: 16.3 % (ref 12.3–15.4)
GFR SERPL CREATININE-BSD FRML MDRD: 69 ML/MIN/1.73
GLUCOSE BLD-MCNC: 100 MG/DL (ref 65–99)
HCT VFR BLD AUTO: 28.5 % (ref 34–46.6)
HGB BLD-MCNC: 8.9 G/DL (ref 12–15.9)
IMM GRANULOCYTES # BLD AUTO: 0.11 10*3/MM3 (ref 0–0.05)
IMM GRANULOCYTES NFR BLD AUTO: 1.1 % (ref 0–0.5)
LYMPHOCYTES # BLD AUTO: 1.51 10*3/MM3 (ref 0.7–3.1)
LYMPHOCYTES NFR BLD AUTO: 15.2 % (ref 19.6–45.3)
MCH RBC QN AUTO: 28.8 PG (ref 26.6–33)
MCHC RBC AUTO-ENTMCNC: 31.2 G/DL (ref 31.5–35.7)
MCV RBC AUTO: 92.2 FL (ref 79–97)
MONOCYTES # BLD AUTO: 1.09 10*3/MM3 (ref 0.1–0.9)
MONOCYTES NFR BLD AUTO: 11 % (ref 5–12)
NEUTROPHILS # BLD AUTO: 7.02 10*3/MM3 (ref 1.7–7)
NEUTROPHILS NFR BLD AUTO: 70.9 % (ref 42.7–76)
PLATELET # BLD AUTO: 178 10*3/MM3 (ref 140–450)
PMV BLD AUTO: 11.6 FL (ref 6–12)
POTASSIUM BLD-SCNC: 3.7 MMOL/L (ref 3.5–5.2)
RBC # BLD AUTO: 3.09 10*6/MM3 (ref 3.77–5.28)
SODIUM BLD-SCNC: 136 MMOL/L (ref 136–145)
WBC NRBC COR # BLD: 9.91 10*3/MM3 (ref 3.4–10.8)

## 2020-01-09 PROCEDURE — 94799 UNLISTED PULMONARY SVC/PX: CPT

## 2020-01-09 PROCEDURE — 99232 SBSQ HOSP IP/OBS MODERATE 35: CPT | Performed by: NURSE PRACTITIONER

## 2020-01-09 PROCEDURE — 80048 BASIC METABOLIC PNL TOTAL CA: CPT | Performed by: NURSE PRACTITIONER

## 2020-01-09 PROCEDURE — 85025 COMPLETE CBC W/AUTO DIFF WBC: CPT | Performed by: NURSE PRACTITIONER

## 2020-01-09 PROCEDURE — 25010000002 CEFTRIAXONE SODIUM-DEXTROSE 2-2.22 GM-%(50ML) RECONSTITUTED SOLUTION: Performed by: NURSE PRACTITIONER

## 2020-01-09 RX ORDER — CEFDINIR 300 MG/1
300 CAPSULE ORAL EVERY 12 HOURS SCHEDULED
Status: DISCONTINUED | OUTPATIENT
Start: 2020-01-09 | End: 2020-01-10 | Stop reason: HOSPADM

## 2020-01-09 RX ORDER — NICOTINE 21 MG/24HR
1 PATCH, TRANSDERMAL 24 HOURS TRANSDERMAL
Status: DISCONTINUED | OUTPATIENT
Start: 2020-01-09 | End: 2020-01-10 | Stop reason: HOSPADM

## 2020-01-09 RX ORDER — VANCOMYCIN HYDROCHLORIDE 125 MG/1
125 CAPSULE ORAL 4 TIMES DAILY
Qty: 64 CAPSULE | Refills: 0 | OUTPATIENT
Start: 2020-01-09 | End: 2020-01-17

## 2020-01-09 RX ORDER — CARBAMAZEPINE 100 MG/1
100 TABLET, EXTENDED RELEASE ORAL EVERY 12 HOURS SCHEDULED
Status: DISCONTINUED | OUTPATIENT
Start: 2020-01-09 | End: 2020-01-10 | Stop reason: HOSPADM

## 2020-01-09 RX ORDER — NICOTINE 21 MG/24HR
1 PATCH, TRANSDERMAL 24 HOURS TRANSDERMAL
Status: DISCONTINUED | OUTPATIENT
Start: 2020-01-09 | End: 2020-01-09

## 2020-01-09 RX ORDER — L.ACID,PARA/B.BIFIDUM/S.THERM 8B CELL
1 CAPSULE ORAL DAILY
Status: DISCONTINUED | OUTPATIENT
Start: 2020-01-09 | End: 2020-01-10 | Stop reason: HOSPADM

## 2020-01-09 RX ADMIN — ACETAMINOPHEN 1000 MG: 500 TABLET, FILM COATED ORAL at 06:55

## 2020-01-09 RX ADMIN — VANCOMYCIN HYDROCHLORIDE 125 MG: 125 CAPSULE ORAL at 17:57

## 2020-01-09 RX ADMIN — FLUOXETINE 20 MG: 20 CAPSULE ORAL at 09:01

## 2020-01-09 RX ADMIN — ACETAMINOPHEN 1000 MG: 500 TABLET, FILM COATED ORAL at 18:09

## 2020-01-09 RX ADMIN — NICOTINE 1 PATCH: 21 PATCH TRANSDERMAL at 17:58

## 2020-01-09 RX ADMIN — VANCOMYCIN HYDROCHLORIDE 125 MG: 125 CAPSULE ORAL at 12:33

## 2020-01-09 RX ADMIN — CARBAMAZEPINE 100 MG: 100 TABLET, EXTENDED RELEASE ORAL at 21:59

## 2020-01-09 RX ADMIN — MELATONIN TAB 5 MG 10 MG: 5 TAB at 21:59

## 2020-01-09 RX ADMIN — PANTOPRAZOLE SODIUM 40 MG: 40 INJECTION, POWDER, FOR SOLUTION INTRAVENOUS at 06:47

## 2020-01-09 RX ADMIN — VANCOMYCIN HYDROCHLORIDE 125 MG: 125 CAPSULE ORAL at 09:01

## 2020-01-09 RX ADMIN — METHADONE HYDROCHLORIDE 10 MG: 10 TABLET ORAL at 14:00

## 2020-01-09 RX ADMIN — METHADONE HYDROCHLORIDE 10 MG: 10 TABLET ORAL at 21:59

## 2020-01-09 RX ADMIN — ALPRAZOLAM 0.5 MG: 0.25 TABLET ORAL at 17:57

## 2020-01-09 RX ADMIN — Medication 1 CAPSULE: at 14:01

## 2020-01-09 RX ADMIN — CEFDINIR 300 MG: 300 CAPSULE ORAL at 14:01

## 2020-01-09 RX ADMIN — ALPRAZOLAM 0.5 MG: 0.25 TABLET ORAL at 08:58

## 2020-01-09 RX ADMIN — VANCOMYCIN HYDROCHLORIDE 125 MG: 125 CAPSULE ORAL at 22:02

## 2020-01-09 RX ADMIN — SODIUM CHLORIDE, PRESERVATIVE FREE 10 ML: 5 INJECTION INTRAVENOUS at 09:02

## 2020-01-09 RX ADMIN — SODIUM CHLORIDE, PRESERVATIVE FREE 10 ML: 5 INJECTION INTRAVENOUS at 22:00

## 2020-01-09 RX ADMIN — METHADONE HYDROCHLORIDE 10 MG: 10 TABLET ORAL at 06:47

## 2020-01-09 RX ADMIN — ALPRAZOLAM 0.5 MG: 0.25 TABLET ORAL at 21:59

## 2020-01-09 RX ADMIN — CEFDINIR 300 MG: 300 CAPSULE ORAL at 21:59

## 2020-01-09 RX ADMIN — CEFTRIAXONE 2 G: 2 INJECTION, SOLUTION INTRAVENOUS at 09:04

## 2020-01-09 RX ADMIN — ALPRAZOLAM 0.5 MG: 0.25 TABLET ORAL at 12:28

## 2020-01-09 RX ADMIN — CARBAMAZEPINE 200 MG: 100 TABLET, EXTENDED RELEASE ORAL at 09:01

## 2020-01-09 NOTE — PROGRESS NOTES
"SERVICE: [facility] HOSPITALIST    CONSULTANTS:    CHIEF COMPLAINT: Follow-up acute pyelonephritis, C. difficile colitis, drug withdrawal    SUBJECTIVE: She reports she is feeling dramatically better today.  She now believes that much of her symptoms were related to drug withdrawal as well as other infections.  She is concerned about discharge and finding methadone clinic and provider to handle her concerns when she is discharged.  She plans to stay in the area as long as needed and is unsure about whether or not she will stay here or go back to [place].  She otherwise denies f/c/cough/soa/chest pain/n/v/d/abdominal pain or other new concerns.    OBJECTIVE:    /82 (BP Location: Right arm, Patient Position: Lying)   Pulse 111   Temp 99.1 °F (37.3 °C) (Oral)   Resp 18   Ht 157.5 cm (62\")   Wt 78 kg (172 lb)   LMP 12/30/2019   SpO2 92%   BMI 31.46 kg/m²     MEDS/LABS REVIEWED AND ORDERED    ALPRAZolam 0.5 mg Oral 4x Daily   carBAMazepine  mg Oral Q12H   cefdinir 300 mg Oral Q12H   FLUoxetine 20 mg Oral Daily   lactobacillus acidophilus 1 capsule Oral Daily   melatonin 10 mg Oral Nightly   methadone 10 mg Oral Q8H   potassium chloride 40 mEq Oral Once   sodium chloride 10 mL Intravenous Q12H   vancomycin 125 mg Oral 4x Daily     Physical Exam   Constitutional: She is oriented to person, place, and time. She appears well-developed and well-nourished.   HENT:   Head: Normocephalic and atraumatic.   Eyes: Pupils are equal, round, and reactive to light. EOM are normal.   Cardiovascular: Normal rate and regular rhythm.   Pulmonary/Chest: Effort normal and breath sounds normal. No stridor. No respiratory distress. She has no wheezes.   Abdominal: Soft. Bowel sounds are normal. She exhibits no distension. There is no tenderness. There is no guarding.   Musculoskeletal: She exhibits no edema.   Neurological: She is alert and oriented to person, place, and time.   Skin: Skin is warm and dry. " No erythema.   Tattooing of skin noted   Psychiatric: She has a normal mood and affect. Her behavior is normal.   Vitals reviewed.    LAB/DIAGNOSTICS:    Lab Results (last 24 hours)     Procedure Component Value Units Date/Time    Basic Metabolic Panel [[id number]]  (Abnormal) Collected:  01/09/20 0615    Specimen:  Blood Updated:  01/09/20 0658     Glucose 100 mg/dL      BUN 7 mg/dL      Creatinine 0.96 mg/dL      Sodium 136 mmol/L      Potassium 3.7 mmol/L      Chloride 102 mmol/L      CO2 20.7 mmol/L      Calcium 7.8 mg/dL      eGFR Non African Amer 69 mL/min/1.73      BUN/Creatinine Ratio 7.3     Anion Gap 13.3 mmol/L     Narrative:       GFR Normal >60  Chronic Kidney Disease <60  Kidney Failure <15      CBC & Differential [[id number]] Collected:  01/09/20 0615    Specimen:  Blood Updated:  01/09/20 0646    Narrative:       The following orders were created for panel order CBC & Differential.  Procedure                               Abnormality         Status                     ---------                               -----------         ------                     CBC Auto Differential[[id number]]        Abnormal            Final result                 Please view results for these tests on the individual orders.    CBC Auto Differential [[id number]]  (Abnormal) Collected:  01/09/20 0615    Specimen:  Blood Updated:  01/09/20 0646     WBC 9.91 10*3/mm3      RBC 3.09 10*6/mm3      Hemoglobin 8.9 g/dL      Hematocrit 28.5 %      MCV 92.2 fL      MCH 28.8 pg      MCHC 31.2 g/dL      RDW 16.3 %      RDW-SD 55.9 fl      MPV 11.6 fL      Platelets 178 10*3/mm3      Neutrophil % 70.9 %      Lymphocyte % 15.2 %      Monocyte % 11.0 %      Eosinophil % 1.5 %      Basophil % 0.3 %      Immature Grans % 1.1 %      Neutrophils, Absolute 7.02 10*3/mm3      Lymphocytes, Absolute 1.51 10*3/mm3      Monocytes, Absolute 1.09 10*3/mm3      Eosinophils, Absolute 0.15 10*3/mm3      Basophils, Absolute 0.03 10*3/mm3      Immature  Grans, Absolute 0.11 10*3/mm3     Potassium [[id number]]  (Normal) Collected:  01/08/20 2152    Specimen:  Blood Updated:  01/08/20 2211     Potassium 3.9 mmol/L     Blood Culture - Blood, Arm, Right [[id number]] Collected:  01/06/20 2117    Specimen:  Blood from Arm, Right Updated:  01/08/20 2130     Blood Culture No growth at 2 days    Blood Culture - Blood, Hand, Right [[id number]] Collected:  01/06/20 1702    Specimen:  Blood from Hand, Right Updated:  01/08/20 1715     Blood Culture No growth at 2 days        No orders to display     Results for orders placed during the hospital encounter of 01/06/20   Adult Transthoracic Echo Complete W/ Cont if Necessary Per Protocol    Narrative · Left ventricular systolic function is normal. Calculated EF = 61.0%.   Estimated EF was in agreement with the calculated EF. Normal left   ventricular cavity size and wall thickness noted. All left ventricular   wall segments contract normally. Left ventricular diastolic function is   normal.  · Estimated right ventricular systolic pressure from tricuspid   regurgitation is mildly elevated (35-45 mmHg). Calculated right   ventricular systolic pressure from tricuspid regurgitation is 40.1 mmHg.        No radiology results for the last day    ASSESSMENT/PLAN:  Sepsis 2/2 acute right E. coli pyelonephritis/acute CDC:  History of skin abscesses:  Ruled out pancreatitis:  Initially on Rocephin, change to cefdinir x6 more days, add probiotic  (Levaquin interacts with methadone)  Discontinue Protonix, de-escalate Tegretol to 100 mg every 12 hours then off tomorrow  Continue oral vancomycin, Rx sent for 16 days of therapy (must overlap antibiotic and oral vancomycin)  Diet advanced to regular  Continues off IV fluids  Daily improvement     Acute methadone and benzodiazepine withdrawal:  Previously on Xanax 2 mg daily, methadone 30 mg daily  Currently on methadone 10 mg every 8 hours, Xanax 0.5 mg every 6 hours  Tegretol decreased in half  "today  Continue to monitor with seizure precautions and fall precautions  Case management attempting to find methadone provider which will be mandatory before discharge     Acute kidney injury: Resolved  Electrolyte imbalance:  Creatinine now 0.96, off IV hydration  Continues electrolyte replacement protocols as needed     Anemia/Thrombocytopenia:  Likely 2/2 acute illness  Hemoglobin 8.9, platelets 178,000, no active blood loss noted  Will need follow-up outpatient     Tachycardia: 2/2 sepsis resolved  Echo showed normal EF, mildly elevated RVSP  Likely needs outpatient sleep study after discharge as staff has noted hypoxia at night    PLAN FOR DISPOSITION: Hopefully home tomorrow if able to establish PCP and methadone provider    LESLIE Kendall  Hospitalist, [facility]  01/09/20  9:30 AM    \"Dictated utilizing Dragon dictation\"    "

## 2020-01-09 NOTE — PLAN OF CARE
Problem: Patient Care Overview  Goal: Plan of Care Review  Outcome: Ongoing (interventions implemented as appropriate)  Flowsheets (Taken 1/9/2020 1413)  Progress: improving  Plan of Care Reviewed With: patient  Outcome Summary: pt resting in bed. a&o x3. pt ambulates self to bathroom. continues on 1L o2 nc. pt tolerating diet. pt in contact spore isolation for cdiff. continues with scheduled xanax and methadone. pt has no complaints at this time.     Problem: Infection, Risk/Actual (Adult)  Goal: Infection Prevention/Resolution  Outcome: Ongoing (interventions implemented as appropriate)  Flowsheets (Taken 1/9/2020 1413)  Infection Prevention/Resolution: making progress toward outcome     Problem: Diarrhea (Adult)  Goal: Improved/Reduced Symptoms  Outcome: Ongoing (interventions implemented as appropriate)  Flowsheets (Taken 1/9/2020 1413)  Improved/Reduced Symptoms: making progress toward outcome    Goal: Optimal Fluid Balance  Outcome: Ongoing (interventions implemented as appropriate)  Flowsheets (Taken 1/9/2020 1413)  Optimal Fluid Balance: making progress toward outcome     Problem: Impaired Control (Excessive Substance Use) (Adult)  Goal: Participates in Recovery Program (Excessive Substance Use)  Outcome: Ongoing (interventions implemented as appropriate)     Problem: Safety Awareness Impairment (Excessive Substance Use) (Adult)  Goal: Enhanced Safety Awareness (Excessive Substance Use)  Outcome: Ongoing (interventions implemented as appropriate)  Flowsheets (Taken 1/9/2020 1413)  Enhanced Safety Awareness Action Step (STG) Outcome: making progress toward outcome

## 2020-01-09 NOTE — DISCHARGE INSTR - OTHER ORDERS
[facility] [phone]  [id number] [facility] [id number]    Fri/Sat hours 5am - 1230pm    INTAKE PROCESS  Bring valid ID & all prescriptions.   Meet with facility personnel - 3-4 hour process  $16 daily cash price

## 2020-01-09 NOTE — PLAN OF CARE
Pt had uneventful night, tolerating PO medications, continues with ice & ginger ale, no complaints of nausea or vomiting, Timed labs collected, result demonstrated therapeutic treatment. Pt is alert & oriented, VSS, resting quietly in bed. Will continue to monitor

## 2020-01-09 NOTE — NURSING NOTE
"Continued Stay Note  BONITA Cole     Patient Name: Talia Root  MRN: [id number]  Today's Date: 1/9/2020    Admit Date: 1/6/2020    Discharge Plan     Row Name 01/09/20 1528       Plan    Plan  plan home with family, assess needs    Patient/Family in Agreement with Plan  yes    Plan Comments  Call placed to [facility] requesting information r/t methadone clinic. Spoke with Debo/Elana constantino who calls [facility] and  [facility] - neither center uses methadone for treatment. Spoke with Veronica/[facility] who states patient must come to their location, bring all Rx's and valid ID for intake which is a 3-4 hour process. The cost is $16/day cash only. They are open  5am-1230pm on Friday and Saturday and they will do intake on Saturday.  CM will fax MAR and DC summary to assist with intake if patient is agreeable to going. Spoke with patient at bedside, she states her grandfather can provide transportation to [facility] and she was not upset by the $16/day cash roth. Spoke with Cheryl[facility] [place] - cash price for po vanc is $890.14. Good Rx Card info provided, price drops to $195.52.  Zayda APRN updated. Return to patient room, informed plan to dc tomorrow am so that she can go to [facility] for processing tomorrow.  Updated on price of po Vanc. She expresses alarm of inital cost and acknowledges Good Rx cost is better but asks \"can't I just take immodium or something?\".  She also asks about Rx for her prozac and xanax. Enc to discuss with Zayda in am.Good Rx Card provided and left on bedside table. Case discussed with LEANA Hernandez/ - Zayda updated r/t cost of medications. CM will follow up in am to assist with dc needs.        Discharge Codes    No documentation.             Aniceto Chang RN    "

## 2020-01-10 VITALS
SYSTOLIC BLOOD PRESSURE: 120 MMHG | WEIGHT: 172 LBS | HEIGHT: 62 IN | BODY MASS INDEX: 31.65 KG/M2 | RESPIRATION RATE: 18 BRPM | DIASTOLIC BLOOD PRESSURE: 75 MMHG | TEMPERATURE: 98.9 F | OXYGEN SATURATION: 93 % | HEART RATE: 105 BPM

## 2020-01-10 LAB
ANION GAP SERPL CALCULATED.3IONS-SCNC: 16.5 MMOL/L (ref 5–15)
BASOPHILS # BLD AUTO: 0.01 10*3/MM3 (ref 0–0.2)
BASOPHILS NFR BLD AUTO: 0.1 % (ref 0–1.5)
BUN BLD-MCNC: 6 MG/DL (ref 6–20)
BUN/CREAT SERPL: 7.1 (ref 7–25)
CALCIUM SPEC-SCNC: 8.3 MG/DL (ref 8.6–10.5)
CHLORIDE SERPL-SCNC: 101 MMOL/L (ref 98–107)
CO2 SERPL-SCNC: 20.5 MMOL/L (ref 22–29)
CREAT BLD-MCNC: 0.85 MG/DL (ref 0.57–1)
DEPRECATED RDW RBC AUTO: 57.5 FL (ref 37–54)
EOSINOPHIL # BLD AUTO: 0.15 10*3/MM3 (ref 0–0.4)
EOSINOPHIL NFR BLD AUTO: 1.4 % (ref 0.3–6.2)
ERYTHROCYTE [DISTWIDTH] IN BLOOD BY AUTOMATED COUNT: 16.4 % (ref 12.3–15.4)
GFR SERPL CREATININE-BSD FRML MDRD: 80 ML/MIN/1.73
GLUCOSE BLD-MCNC: 81 MG/DL (ref 65–99)
HCT VFR BLD AUTO: 33.5 % (ref 34–46.6)
HGB BLD-MCNC: 10.3 G/DL (ref 12–15.9)
IMM GRANULOCYTES # BLD AUTO: 0.17 10*3/MM3 (ref 0–0.05)
IMM GRANULOCYTES NFR BLD AUTO: 1.5 % (ref 0–0.5)
LYMPHOCYTES # BLD AUTO: 1.22 10*3/MM3 (ref 0.7–3.1)
LYMPHOCYTES NFR BLD AUTO: 11.1 % (ref 19.6–45.3)
MCH RBC QN AUTO: 28.9 PG (ref 26.6–33)
MCHC RBC AUTO-ENTMCNC: 30.7 G/DL (ref 31.5–35.7)
MCV RBC AUTO: 94.1 FL (ref 79–97)
MONOCYTES # BLD AUTO: 0.96 10*3/MM3 (ref 0.1–0.9)
MONOCYTES NFR BLD AUTO: 8.7 % (ref 5–12)
NEUTROPHILS # BLD AUTO: 8.48 10*3/MM3 (ref 1.7–7)
NEUTROPHILS NFR BLD AUTO: 77.2 % (ref 42.7–76)
PLATELET # BLD AUTO: 233 10*3/MM3 (ref 140–450)
PMV BLD AUTO: 11.1 FL (ref 6–12)
POTASSIUM BLD-SCNC: 3.7 MMOL/L (ref 3.5–5.2)
RBC # BLD AUTO: 3.56 10*6/MM3 (ref 3.77–5.28)
SODIUM BLD-SCNC: 138 MMOL/L (ref 136–145)
WBC NRBC COR # BLD: 10.99 10*3/MM3 (ref 3.4–10.8)

## 2020-01-10 PROCEDURE — 80048 BASIC METABOLIC PNL TOTAL CA: CPT | Performed by: NURSE PRACTITIONER

## 2020-01-10 PROCEDURE — 25010000002 PROMETHAZINE PER 50 MG: Performed by: NURSE PRACTITIONER

## 2020-01-10 PROCEDURE — 99239 HOSP IP/OBS DSCHRG MGMT >30: CPT | Performed by: NURSE PRACTITIONER

## 2020-01-10 PROCEDURE — 85025 COMPLETE CBC W/AUTO DIFF WBC: CPT | Performed by: NURSE PRACTITIONER

## 2020-01-10 RX ORDER — L.ACID,PARA/B.BIFIDUM/S.THERM 8B CELL
1 CAPSULE ORAL DAILY
Qty: 30 CAPSULE | Refills: 0 | Status: SHIPPED | OUTPATIENT
Start: 2020-01-10

## 2020-01-10 RX ORDER — CEFDINIR 300 MG/1
300 CAPSULE ORAL EVERY 12 HOURS SCHEDULED
Qty: 10 CAPSULE | Refills: 0 | Status: SHIPPED | OUTPATIENT
Start: 2020-01-10 | End: 2020-01-15

## 2020-01-10 RX ORDER — ALPRAZOLAM 1 MG/1
1 TABLET ORAL 2 TIMES DAILY
Qty: 14 TABLET | Refills: 0 | Status: SHIPPED | OUTPATIENT
Start: 2020-01-10 | End: 2020-01-17

## 2020-01-10 RX ORDER — FLUOXETINE HYDROCHLORIDE 20 MG/1
20 CAPSULE ORAL DAILY
Qty: 30 CAPSULE | Refills: 1 | Status: SHIPPED | OUTPATIENT
Start: 2020-01-10

## 2020-01-10 RX ORDER — CHOLECALCIFEROL (VITAMIN D3) 125 MCG
5 CAPSULE ORAL NIGHTLY
Start: 2020-01-10 | End: 2020-01-28

## 2020-01-10 RX ORDER — NICOTINE 21 MG/24HR
1 PATCH, TRANSDERMAL 24 HOURS TRANSDERMAL
Qty: 21 EACH | Refills: 0 | Status: SHIPPED | OUTPATIENT
Start: 2020-01-10 | End: 2020-01-28

## 2020-01-10 RX ORDER — ONDANSETRON 4 MG/1
4 TABLET, FILM COATED ORAL EVERY 6 HOURS PRN
Qty: 20 TABLET | Refills: 0 | Status: SHIPPED | OUTPATIENT
Start: 2020-01-10 | End: 2020-01-28

## 2020-01-10 RX ORDER — METHADONE HYDROCHLORIDE 10 MG/1
30 TABLET ORAL ONCE
Status: COMPLETED | OUTPATIENT
Start: 2020-01-10 | End: 2020-01-10

## 2020-01-10 RX ADMIN — Medication 1 CAPSULE: at 08:23

## 2020-01-10 RX ADMIN — CARBAMAZEPINE 100 MG: 100 TABLET, EXTENDED RELEASE ORAL at 08:23

## 2020-01-10 RX ADMIN — CEFDINIR 300 MG: 300 CAPSULE ORAL at 08:23

## 2020-01-10 RX ADMIN — VANCOMYCIN HYDROCHLORIDE 125 MG: 125 CAPSULE ORAL at 08:23

## 2020-01-10 RX ADMIN — SODIUM CHLORIDE, PRESERVATIVE FREE 10 ML: 5 INJECTION INTRAVENOUS at 08:25

## 2020-01-10 RX ADMIN — METHADONE HYDROCHLORIDE 30 MG: 10 TABLET ORAL at 08:23

## 2020-01-10 RX ADMIN — METHADONE HYDROCHLORIDE 10 MG: 10 TABLET ORAL at 06:17

## 2020-01-10 RX ADMIN — ALPRAZOLAM 0.5 MG: 0.25 TABLET ORAL at 08:23

## 2020-01-10 RX ADMIN — FLUOXETINE 20 MG: 20 CAPSULE ORAL at 08:23

## 2020-01-10 RX ADMIN — PROMETHAZINE HYDROCHLORIDE 12.5 MG: 25 INJECTION INTRAMUSCULAR; INTRAVENOUS at 06:16

## 2020-01-10 NOTE — NURSING NOTE
Continued Stay Note  BONITA Cole     Patient Name: Talia Root  MRN: [id number]  Today's Date: 1/10/2020    Admit Date: 1/6/2020    Discharge Plan     Row Name 01/10/20 0819       Plan    Plan  plan home with family    Patient/Family in Agreement with Plan  yes    Plan Comments  Spoke with Veronica/[facility] ([phone]) to inform patient will dc today and will receive todays dose of methadone prior to dc. DC Summary/MAR faxed to [phone]. CM will continue to follow and assist with dc needs.        Discharge Codes    No documentation.             Aniceto Chang RN

## 2020-01-10 NOTE — DISCHARGE SUMMARY
"Talia Root  1990  [id number]    Hospitalists Discharge Summary    Date of Admission: 1/6/2020  Date of Discharge:  1/10/2020    History of Present Illness: Taken from [facility] [place] on admit:  \"The patient is a 29-year-old female that presented to the emergency department secondary to 6 days of fever, chills, nausea, vomiting, abdominal pain, back pain, dysuria.  She notes she moved here from [place] 6 days ago.  She reports she has a past history of IV drug abuse with heroin approximately 5 years ago.  She has been on methadone and Xanax last doses before [facility].       She reports a history of skin abscesses in past, depression, IVDA.  She reports she is not sexually active, LMP approximately 2 weeks ago, no history of STDs.     She otherwise denies headache/rhinorrhea/nasal congestion/lightheadedness/syncopal sensation/cough/soa/diarrhea/chest pain/recent illness/sick exposures/change in bowel or bladder habits/no weight change/bloody emesis or bloody stools/change in medications or any other new concerns.\"  Hospital Course Summary/Primary Discharge Diagnoses:  Sepsis 2/2 acute right E. coli pyelonephritis/acute CDC:  History of skin abscesses:  Ruled out pancreatitis:  Initially on Rocephin, changed to cefdinir x 5 more days, continue probiotic (Levaquin interacts with methadone)  Oral vancomycin x 15 days to overlap antibiotics  Continue probiotic  F/U new pcp 1 week     Acute methadone and benzodiazepine withdrawal:  Previously on Xanax 2 mg daily, methadone 30 mg daily  Received tegretol, de-escalated to off without seizures  Received methadone 10 mg every 8 hours, Xanax 0.5 mg every 6 hours throughout hospitalization  Discharging today directly to methadone clinic provider  Methadone 30 mg and Xanax 0.5 mg given prior to discharge  RX sent for xanax 1.0 mg bid x 7 days, #14 NR  Patient is to go directly to clinic now    Secondary Discharge Diagnoses:   Acute kidney injury versus " dehydration: Resolved  Electrolyte imbalance/hyponatremia:  Received IV hydration and electrolyte replacement protocol  Initial creatinine 1.01/1.05, currently 0.85  Follow-up PCP 1 week     Anemia/Thrombocytopenia:  Likely 2/2 acute illness, resolving  Hemoglobin 10.3, platelets 233,000  No active blood loss  Follow-up PCP 1 week     Tachycardia: 2/2 sepsis resolved  Echo showed normal EF, mildly elevated RVSP  Refer to outpatient sleep lab  Follow-up PCP    3 cm left ovarian cyst:  Plan follow-up with GYN, new patient appointment requested    Mild hepatomegaly: Noted on initial CT, follow-up PCP    Obesity: Counseled  Body mass index is 31.46 kg/m².    PCP  Patient Care Team:  Provider, No Known as PCP - General    Consults:   Consults     No orders found from 12/8/2019 to 1/7/2020.        Operations and Procedures Performed:     Ct Abdomen Pelvis Without Contrast    Result Date: 1/6/2020  Narrative: CT abdomen and pelvis without contrast   01/06/2020  HISTORY: Generalized abdomen pain and vomiting for 6 days  COMPARISON: NONE  TECHNIQUE:  CT of Abdomen and Pelvis without contrast performed.  Sagittal and Coronal reconstructions performed. Radiation dose reduction techniques included automated exposure control or exposure modulation based on body size. Radiation audit for CT and nuclear cardiology exams in the last 12 months: 0.  FINDINGS:  Abdomen: Lung bases are clear. Liver slightly enlarged. There are no focal lesions. The gallbladder, spleen, pancreas, and adrenal glands are normal.. The left kidney appears normal. The right kidney may be slightly swollen with effacement of the fatty tissue around the renal pelvis. No definite stranding is noted around the kidney. There are no stones. The aorta is normal in size. The bowel is normal.   Pelvis: Uterus and right ovary appear normal. Left ovary has a small cyst measuring 3 cm in diameter. Bones are unremarkable.       Impression: There is a subtle findings that  may represent edema in the right kidney. Correlation with urinalysis recommended to check for possible pyelonephritis.. No stones are identified. There is no evidence of bowel obstruction  Otherwise study is normal except for mild prominence of the liver  This report was finalized on 1/6/2020 2:00 PM by Dr. Td Lindo MD.      Allergies:  is allergic to trazodone.    [place]  No medications noted per report of 1/6/2020, reviewed by me    Discharge Medications:     Discharge Medications      New Medications      Instructions Start Date   cefdinir 300 MG capsule  Commonly known as:  OMNICEF   300 mg, Oral, Every 12 Hours Scheduled      lactobacillus acidophilus capsule capsule   1 capsule, Oral, Daily      melatonin 5 MG tablet tablet   5 mg, Oral, Nightly      nicotine 21 MG/24HR patch  Commonly known as:  NICODERM CQ   1 patch, Transdermal, Every 24 Hours Scheduled      ondansetron 4 MG tablet  Commonly known as:  ZOFRAN   4 mg, Oral, Every 6 Hours PRN      vancomycin 125 MG capsule  Commonly known as:  VANCOCIN   125 mg, Oral, 4 Times Daily         Continue These Medications      Instructions Start Date   ALPRAZolam 1 MG tablet  Commonly known as:  XANAX   1 mg, Oral, 2 Times Daily      FLUoxetine 20 MG capsule  Commonly known as:  PROzac   20 mg, Oral, Daily             Last Lab Results:   Lab Results (most recent)     Procedure Component Value Units Date/Time    CBC & Differential [[id number]] Collected:  01/10/20 0656    Specimen:  Blood Updated:  01/10/20 0710    Narrative:       The following orders were created for panel order CBC & Differential.  Procedure                               Abnormality         Status                     ---------                               -----------         ------                     CBC Auto Differential[[id number]]        Abnormal            Final result                 Please view results for these tests on the individual orders.    CBC Auto Differential [[id number]]   (Abnormal) Collected:  01/10/20 0656    Specimen:  Blood Updated:  01/10/20 0710     WBC 10.99 10*3/mm3      RBC 3.56 10*6/mm3      Hemoglobin 10.3 g/dL      Hematocrit 33.5 %      MCV 94.1 fL      MCH 28.9 pg      MCHC 30.7 g/dL      RDW 16.4 %      RDW-SD 57.5 fl      MPV 11.1 fL      Platelets 233 10*3/mm3      Neutrophil % 77.2 %      Lymphocyte % 11.1 %      Monocyte % 8.7 %      Eosinophil % 1.4 %      Basophil % 0.1 %      Immature Grans % 1.5 %      Neutrophils, Absolute 8.48 10*3/mm3      Lymphocytes, Absolute 1.22 10*3/mm3      Monocytes, Absolute 0.96 10*3/mm3      Eosinophils, Absolute 0.15 10*3/mm3      Basophils, Absolute 0.01 10*3/mm3      Immature Grans, Absolute 0.17 10*3/mm3     Basic Metabolic Panel [[id number]] Collected:  01/10/20 0656    Specimen:  Blood Updated:  01/10/20 0656    Blood Culture - Blood, Arm, Right [[id number]] Collected:  01/06/20 2117    Specimen:  Blood from Arm, Right Updated:  01/09/20 2130     Blood Culture No growth at 3 days    Blood Culture - Blood, Hand, Right [[id number]] Collected:  01/06/20 1702    Specimen:  Blood from Hand, Right Updated:  01/09/20 1715     Blood Culture No growth at 3 days    Basic Metabolic Panel [[id number]]  (Abnormal) Collected:  01/09/20 0615    Specimen:  Blood Updated:  01/09/20 0658     Glucose 100 mg/dL      BUN 7 mg/dL      Creatinine 0.96 mg/dL      Sodium 136 mmol/L      Potassium 3.7 mmol/L      Chloride 102 mmol/L      CO2 20.7 mmol/L      Calcium 7.8 mg/dL      eGFR Non African Amer 69 mL/min/1.73      BUN/Creatinine Ratio 7.3     Anion Gap 13.3 mmol/L     Narrative:       GFR Normal >60  Chronic Kidney Disease <60  Kidney Failure <15      CBC & Differential [[id number]] Collected:  01/09/20 0615    Specimen:  Blood Updated:  01/09/20 0646    Narrative:       The following orders were created for panel order CBC & Differential.  Procedure                               Abnormality         Status                     ---------                                -----------         ------                     CBC Auto Differential[[id number]]        Abnormal            Final result                 Please view results for these tests on the individual orders.    CBC Auto Differential [[id number]]  (Abnormal) Collected:  01/09/20 0615    Specimen:  Blood Updated:  01/09/20 0646     WBC 9.91 10*3/mm3      RBC 3.09 10*6/mm3      Hemoglobin 8.9 g/dL      Hematocrit 28.5 %      MCV 92.2 fL      MCH 28.8 pg      MCHC 31.2 g/dL      RDW 16.3 %      RDW-SD 55.9 fl      MPV 11.6 fL      Platelets 178 10*3/mm3      Neutrophil % 70.9 %      Lymphocyte % 15.2 %      Monocyte % 11.0 %      Eosinophil % 1.5 %      Basophil % 0.3 %      Immature Grans % 1.1 %      Neutrophils, Absolute 7.02 10*3/mm3      Lymphocytes, Absolute 1.51 10*3/mm3      Monocytes, Absolute 1.09 10*3/mm3      Eosinophils, Absolute 0.15 10*3/mm3      Basophils, Absolute 0.03 10*3/mm3      Immature Grans, Absolute 0.11 10*3/mm3     Potassium [[id number]]  (Normal) Collected:  01/08/20 2152    Specimen:  Blood Updated:  01/08/20 2211     Potassium 3.9 mmol/L     Beta Strep Culture, Throat - Swab, Throat [[id number]]  (Normal) Collected:  01/06/20 0833    Specimen:  Swab from Throat Updated:  01/08/20 1301     Throat Culture, Beta Strep No Beta Hemolytic Streptococcus Isolated    Narrative:       Group A Strep incidence is low in adults. Positive culture for Beta hemolytic Streptococcus species can reflect colonization and not true infection. Please correlate clinically.    C-reactive Protein [[id number]]  (Abnormal) Collected:  01/08/20 0649    Specimen:  Blood Updated:  01/08/20 1107     C-Reactive Protein 13.10 mg/dL     Magnesium [[id number]]  (Normal) Collected:  01/08/20 0649    Specimen:  Blood Updated:  01/08/20 0943     Magnesium 2.1 mg/dL     Potassium [[id number]]  (Normal) Collected:  01/08/20 0605    Specimen:  Blood Updated:  01/08/20 0648     Potassium 3.6 mmol/L     Procalcitonin [[id number]]   (Abnormal) Collected:  01/08/20 0605    Specimen:  Blood Updated:  01/08/20 0637     Procalcitonin 2.23 ng/mL     Lactic Acid, Plasma [[id number]]  (Normal) Collected:  01/08/20 0605    Specimen:  Blood Updated:  01/08/20 0620     Lactate 1.4 mmol/L     Urine Culture - Urine, Urine, Clean Catch [[id number]]  (Abnormal)  (Susceptibility) Collected:  01/06/20 0833    Specimen:  Urine, Clean Catch Updated:  01/08/20 0318     Urine Culture >100,000 CFU/mL Escherichia coli    Susceptibility      Escherichia coli     DENIA     Ampicillin Susceptible     Ampicillin + Sulbactam Susceptible     Cefazolin Susceptible     Cefepime Susceptible     Ceftazidime Susceptible     Ceftriaxone Susceptible     Gentamicin Susceptible     Levofloxacin Susceptible     Nitrofurantoin Susceptible     Piperacillin + Tazobactam Susceptible     Tetracycline Susceptible     Trimethoprim + Sulfamethoxazole Susceptible                    Gastrointestinal Panel, PCR - Stool, Per Rectum [[id number]]  (Normal) Collected:  01/07/20 1823    Specimen:  Stool from Per Rectum Updated:  01/07/20 2109     Campylobacter Not Detected     Plesiomonas shigelloides Not Detected     Salmonella Not Detected     Vibrio Not Detected     Vibrio cholerae Not Detected     Yersinia enterocolitica Not Detected     Enteroaggregative E. coli (EAEC) Not Detected     Enteropathogenic E. coli (EPEC) Not Detected     Enterotoxigenic E. coli (ETEC) lt/st Not Detected     Shiga-like toxin-producing E. coli (STEC) stx1/stx2 Not Detected     E. coli O157 Not Detected     Shigella/Enteroinvasive E. coli (EIEC) Not Detected     Cryptosporidium Not Detected     Cyclospora cayetanensis Not Detected     Entamoeba histolytica Not Detected     Giardia lamblia Not Detected     Adenovirus F40/41 Not Detected     Astrovirus Not Detected     Norovirus GI/GII Not Detected     Rotavirus A Not Detected     Sapovirus (I, II, IV or V) Not Detected    Narrative:       If Aeromonas, Staphylococcus  aureus or Bacillus cereus are suspected, please order [id number]: Stool Culture, Aeromonas, S aureus, B Cereus.    Lactic Acid, Plasma [[id number]]  (Normal) Collected:  01/07/20 2023    Specimen:  Blood Updated:  01/07/20 2039     Lactate 1.4 mmol/L     Clostridium Difficile Toxin - Stool, Per Rectum [[id number]] Collected:  01/07/20 1850    Specimen:  Stool from Per Rectum Updated:  01/07/20 2029    Narrative:       The following orders were created for panel order Clostridium Difficile Toxin - Stool, Per Rectum.  Procedure                               Abnormality         Status                     ---------                               -----------         ------                     Clostridium Difficile EI...[[id number]]  Abnormal            Final result                 Please view results for these tests on the individual orders.    Clostridium Difficile EIA - Stool, Per Rectum [[id number]]  (Abnormal) Collected:  01/07/20 1850    Specimen:  Stool from Per Rectum Updated:  01/07/20 2029     C Diff GDH / Toxin Positive    Lipase [[id number]]  (Normal) Collected:  01/07/20 0746    Specimen:  Blood Updated:  01/07/20 1313     Lipase 14 U/L     Amylase [[id number]]  (Abnormal) Collected:  01/07/20 0746    Specimen:  Blood Updated:  01/07/20 1313     Amylase 17 U/L     Magnesium [[id number]]  (Abnormal) Collected:  01/07/20 0746    Specimen:  Blood Updated:  01/07/20 0757     Magnesium 1.3 mg/dL     C-reactive Protein [[id number]]  (Abnormal) Collected:  01/06/20 1658    Specimen:  Blood Updated:  01/06/20 1957     C-Reactive Protein 29.41 mg/dL     Procalcitonin [[id number]]  (Abnormal) Collected:  01/06/20 1111    Specimen:  Blood Updated:  01/06/20 1708     Procalcitonin 5.17 ng/mL     TSH [[id number]]  (Normal) Collected:  01/06/20 1111    Specimen:  Blood Updated:  01/06/20 1704     TSH 0.318 uIU/mL     Hemoglobin A1c [[id number]]  (Normal) Collected:  01/06/20 1111    Specimen:  Blood Updated:  01/06/20 1700     Hemoglobin  A1C 5.00 %     Narrative:       Hemoglobin A1C Ranges:    Increased Risk for Diabetes  5.7% to 6.4%  Diabetes                     >= 6.5%  Diabetic Goal                < 7.0%    Sedimentation Rate [[id number]]  (Abnormal) Collected:  01/06/20 1111    Specimen:  Blood Updated:  01/06/20 1515     Sed Rate 41 mm/hr     Comprehensive Metabolic Panel [[id number]]  (Abnormal) Collected:  01/06/20 1111    Specimen:  Blood Updated:  01/06/20 1141     Glucose 132 mg/dL      BUN 10 mg/dL      Creatinine 1.01 mg/dL      Sodium 130 mmol/L      Potassium 2.6 mmol/L      Chloride 89 mmol/L      CO2 23.3 mmol/L      Calcium 7.8 mg/dL      Total Protein 6.8 g/dL      Albumin 3.10 g/dL      ALT (SGPT) 20 U/L      AST (SGOT) 16 U/L      Alkaline Phosphatase 87 U/L      Total Bilirubin 0.6 mg/dL      eGFR Non African Amer 65 mL/min/1.73      Globulin 3.7 gm/dL      A/G Ratio 0.8 g/dL      BUN/Creatinine Ratio 9.9     Anion Gap 17.7 mmol/L     Narrative:       GFR Normal >60  Chronic Kidney Disease <60  Kidney Failure <15      Lipase [[id number]]  (Abnormal) Collected:  01/06/20 1111    Specimen:  Blood Updated:  01/06/20 1137     Lipase 6 U/L     Ethanol [[id number]] Collected:  01/06/20 1111    Specimen:  Blood Updated:  01/06/20 1137     Ethanol <10 mg/dL      Ethanol % <0.010 %     Urinalysis, Microscopic Only - Urine, Clean Catch [[id number]]  (Abnormal) Collected:  01/06/20 0833    Specimen:  Urine, Clean Catch Updated:  01/06/20 0942     RBC, UA 13-20 /HPF      WBC, UA 6-12 /HPF      Bacteria, UA 2+ /HPF      Squamous Epithelial Cells, UA 0-2 /HPF      Hyaline Casts, UA None Seen /LPF      Methodology Manual Light Microscopy    Urine Drug Screen - [[id number]]  (Abnormal) Collected:  01/06/20 0833    Specimen:  Urine Updated:  01/06/20 0930     THC, Screen, Urine Negative     Phencyclidine (PCP), Urine Negative     Cocaine Screen, Urine Negative     Methamphetamine, Ur Negative     Opiate Screen Negative     Amphetamine Screen, Urine  Negative     Benzodiazepine Screen, Urine Positive     Tricyclic Antidepressants Screen Negative     Methadone Screen, Urine Positive     Barbiturates Screen, Urine Negative     Oxycodone Screen, Urine Negative     Propoxyphene Screen Negative     Buprenorphine, Screen, Urine Negative    Narrative:       Urine drug screen results are to be used for medical purposes only.  They are not to be used for legal purposes such as employment testing.  Negative results do not necessarily mean the complete absence of a subtance, but rather that the result is less than the cutoff for that substance.  Positive results are unconfirmed and considered Preliminary Positive.  [facility] does not automatically confirm Postitive Unconfirmed results.  The physician may request (order) an Unconfirmed Positive result to be sent out for confirmation.      Negative Thresholds for Drugs Screened:    THC screen, urine                          50 ng/ml  Phenycyclidine (PCP), urine                25 ng/ml  Cocaine screen, urine                     150 ng/ml  Methamphetamine, urine                    500 ng/ml  Opiate screen, urine                      100 ng/ml  Amphetamine screen, urine                 500 ng/ml  Benzodiazepine screen, urine              150 ng/ml  Tricyclic Antidepressants screen, urine   300 ng/ml  Methadone screen, urine                   200 ng/ml  Barbiturates screen, urine                200 ng/ml  Oxycodone screen, urine                   100 ng/ml  Propoxyphene screen, urine                300 ng/ml  Buprenorphine screen, urine                10 ng/ml    Pregnancy, Urine - Urine, Clean Catch [[id number]]  (Normal) Collected:  01/06/20 0833    Specimen:  Urine, Clean Catch Updated:  01/06/20 0924     HCG, Urine QL Negative    Urinalysis With Microscopic If Indicated (No Culture) - Urine, Clean Catch [[id number]]  (Abnormal) Collected:  01/06/20 0833    Specimen:  Urine, Clean Catch Updated:  01/06/20 0922      Color, UA Yellow     Appearance, UA Clear     pH, UA 6.0     Specific Gravity, UA 1.010     Glucose, UA Negative     Ketones, UA Negative     Bilirubin, UA Negative     Blood, UA Moderate (2+)     Protein,  mg/dL (2+)     Leuk Esterase, UA Small (1+)     Nitrite, UA Positive     Urobilinogen, UA 0.2 E.U./dL    Rapid Strep A Screen - Swab, Throat [[id number]]  (Normal) Collected:  01/06/20 0833    Specimen:  Swab from Throat Updated:  01/06/20 0922     Strep A Ag Negative    Influenza Antigen, Rapid - Swab, Nasopharynx [[id number]]  (Normal) Collected:  01/06/20 0833    Specimen:  Swab from Nasopharynx Updated:  01/06/20 0922     Influenza A Ag, EIA Negative     Influenza B Ag, EIA Negative        Imaging Results (Most Recent)     Procedure Component Value Units Date/Time    CT Abdomen Pelvis Without Contrast [[id number]] Collected:  01/06/20 1352     Updated:  01/06/20 1406    Narrative:       CT abdomen and pelvis without contrast   01/06/2020     HISTORY:  Generalized abdomen pain and vomiting for 6 days     COMPARISON:  NONE     TECHNIQUE:    CT of Abdomen and Pelvis without contrast performed.  Sagittal and  Coronal reconstructions performed. Radiation dose reduction techniques  included automated exposure control or exposure modulation based on body  size. Radiation audit for CT and nuclear cardiology exams in the last 12  months: 0.      FINDINGS:    Abdomen:  Lung bases are clear. Liver slightly enlarged. There are no focal  lesions. The gallbladder, spleen, pancreas, and adrenal glands are  normal.. The left kidney appears normal. The right kidney may be  slightly swollen with effacement of the fatty tissue around the renal  pelvis. No definite stranding is noted around the kidney. There are no  stones. The aorta is normal in size. The bowel is normal.       Pelvis:  Uterus and right ovary appear normal. Left ovary has a small cyst  measuring 3 cm in diameter. Bones are unremarkable.          Impression:       There is a subtle findings that may represent edema in the  right kidney. Correlation with urinalysis recommended to check for  possible pyelonephritis.. No stones are identified. There is no evidence  of bowel obstruction     Otherwise study is normal except for mild prominence of the liver     This report was finalized on 1/6/2020 2:00 PM by Dr. Td Lindo MD.           PROCEDURES: None    Condition on Discharge: Stable    Physical Exam at Discharge  Vital Signs  Temp:  [98 °F (36.7 °C)-100.8 °F (38.2 °C)] 99.5 °F (37.5 °C)  Heart Rate:  [] 110  Resp:  [16-18] 18  BP: ()/(60-83) 123/83   Body mass index is 31.46 kg/m².    Physical Exam   Constitutional: She is oriented to person, place, and time. She appears well-developed and well-nourished.   Obese   HENT:   Head: Normocephalic.   Eyes: Pupils are equal, round, and reactive to light. EOM are normal.   Cardiovascular: Normal rate and regular rhythm.   Pulmonary/Chest: Effort normal and breath sounds normal. No stridor. No respiratory distress. She has no wheezes.   Abdominal: Soft. Bowel sounds are normal. She exhibits no distension. There is no tenderness. There is no guarding.   Musculoskeletal: She exhibits no edema.   Neurological: She is alert and oriented to person, place, and time.   Skin: Skin is warm and dry. No erythema.   Psychiatric: She has a normal mood and affect. Her behavior is normal.   Vitals reviewed.    Discharge Disposition  Methadone clinic then home    Visiting Nurse:    No     Home PT/OT:  No     Home Safety Evaluation:  No     DME  None new    Discharge Diet:    Dietary Orders (From admission, onward)     Start     Ordered    01/08/20 1414  Diet Regular  Diet Effective Now     Question:  Diet Texture / Consistency  Answer:  Regular    01/08/20 1413              Activity at Discharge:  As tolerated    Pre-discharge education  Smoking, medications, follow up    Follow-up Appointments  No future  appointments.  Additional Instructions for the Follow-ups that You Need to Schedule     Discharge Follow-up with PCP   As directed       Currently Documented PCP:    Provider, No Known    PCP Phone Number:    [phone]     Follow Up Details:  Needs new PCP appointment first available, preferably within 1 week         Discharge Follow-up with Specified Provider: GYN; 1 Month   As directed      To:  GYN    Follow Up:  1 Month    Follow Up Details:  New patient appointment         Discharge Follow-up with Specified Provider: Go immediately to methadone clinic   As directed      To:  Go immediately to methadone clinic               Test Results Pending at Discharge: to be f/u by pcp   Order Current Status    Blood Culture - Blood, Arm, Right Preliminary result    Blood Culture - Blood, Hand, Right Preliminary result           Miranda Lozano, APRN  01/10/20  7:41 AM    Time: Discharge over 30 min (if over 30 minutes give explanation as to why it took greater than 30 minutes)  Secondary to:   Coordination of care/follow up  Medication reconciliation  D/W patient and family

## 2020-01-10 NOTE — PLAN OF CARE
"  Problem: Patient Care Overview  Goal: Plan of Care Review  Outcome: Ongoing (interventions implemented as appropriate)  Flowsheets (Taken 1/10/2020 6598)  Progress: improving  Plan of Care Reviewed With: patient  Outcome Summary: VSS and afebrile tonight. Patient denies diarrhea, nausea, or emesis this shift. She did report some intermittent abdominal cramps intermittently, each episode lasting only a few seconds. Continues on oral antibiotics as well as scheduled Alprazolam and Methadone. No acute changes, potential discharge today. AM labs pending. Will monitor.    **ADDENDUM 0617** Patient states she woke from sleep with headache, stomach cramps, shivering, and nausea. Fingertip to fingertip there is no tremor, and there does seem to be some malingering in her shivering. VS show a low-grade fever at 99. I have asked her to limit blanket use and rejected her request to turn up the heat. CIWA at 6 and COW at 4. I offered Zofran for her nausea and she demanded Phenergan, stating that she has been on Zofran and it doesn't ever work and she does not want to take it. When I mentioned that she would need to find alternatives to Phenergan for discharge planning she states \"I know that\". Gave Phenergan IV with NS as ordered slow push over 10 minutes. Report given to dayshift regarding above. They will continue to monitor.      "

## 2020-01-10 NOTE — NURSING NOTE
Continued Stay Note  BONITA Cole     Patient Name: Talia Root  MRN: [id number]  Today's Date: 1/10/2020    Admit Date: 1/6/2020    Discharge Plan     Row Name 01/10/20 1125       Plan    Plan  plan home with family    Plan Comments  Return call from [facility]  who states since patient has not been dc'd from hospital yet, they will not be able to process her for intake today (only open until 1230). She encourages patient to arrive tomorrow when they open at 5am as intake takes 3-4 hours and they will only be open until 1230. Spoke with patient at bedside, she is up, dressed and ambulating in room. Informed to arrive at [facility] as early as possible tomorrow - open at 5am - for intake process. She states she has not discussed cost of po vanc with her grandparents who are picking her up.  CM # provided if patient unable to afford $195 cost. Stressed importance of obtaining the medication to clear infection.Good Rx Card on bedside table, instructed patient to take the card with her to lower cost of medications. Patient verbalizes understanding. CM wll continue to follow through dc.        Discharge Codes    No documentation.       Expected Discharge Date and Time     Expected Discharge Date Expected Discharge Time    Hugo 10, 2020             Aniceto Chang RN

## 2020-01-11 ENCOUNTER — READMISSION MANAGEMENT (OUTPATIENT)
Dept: CALL CENTER | Facility: HOSPITAL | Age: 30
End: 2020-01-11

## 2020-01-11 LAB
BACTERIA SPEC AEROBE CULT: NORMAL
BACTERIA SPEC AEROBE CULT: NORMAL

## 2020-01-11 NOTE — OUTREACH NOTE
Prep Survey      Responses   Facility patient discharged from?  [facility]   Is LACE score < 7 ?  No   Is patient eligible?  Yes   Discharge diagnosis  Sepsis d/t acute right E. Coli Pyelonephritis, Acute CDC, hx skin abscesses, acute methadone and benzo withdrawal   Does the patient have one of the following disease processes/diagnoses(primary or secondary)?  Sepsis   Does the patient have Home health ordered?  No   Is there a DME ordered?  No   Comments regarding appointments  Pt to schedule follow up appointments   General alerts for this patient  Pt to go to [facility] clinic day after discharge for intake   Prep survey completed?  Yes          Kelly Jay RN

## 2020-01-14 ENCOUNTER — READMISSION MANAGEMENT (OUTPATIENT)
Dept: CALL CENTER | Facility: HOSPITAL | Age: 30
End: 2020-01-14

## 2020-01-16 ENCOUNTER — READMISSION MANAGEMENT (OUTPATIENT)
Dept: CALL CENTER | Facility: HOSPITAL | Age: 30
End: 2020-01-16

## 2020-01-16 NOTE — OUTREACH NOTE
Sepsis Week 1 Survey      Responses   Facility patient discharged from?  [facility]   Does the patient have one of the following disease processes/diagnoses(primary or secondary)?  Sepsis   Is there a successful TCM telephone encounter documented?  No   Week 1 attempt successful?  Yes   Call start time  1432   Call end time  1459   Discharge diagnosis  Sepsis d/t acute right E. Coli Pyelonephritis, Acute CDC, hx skin abscesses, acute methadone and benzo withdrawal   Meds reviewed with patient/caregiver?  Yes   Is the patient having any side effects they believe may be caused by any medication additions or changes?  No   Does the patient have all medications related to this admission filled (includes all antibiotics, inhalers, nebulizers,steroids,etc.)  No   What is keeping the patient from filling the prescriptions?  Script on hold per patient [Melatonin, Nicoderm not filled]   Nursing Interventions  No intervention needed   Is the patient taking all medications as directed (includes completed medication regime)?  Yes   Does the patient have a primary care provider?   No   PCP Nursing Intervention  Provided number to obtain PCP   Comments regarding PCP  pt recently moved from [facility], in process of finding PCP and behavioral health clinic   Does the patient have an appointment with their PCP within 7 days of discharge?  N/A   Has the patient kept scheduled appointments due by today?  N/A   Has home health visited the patient within 72 hours of discharge?  N/A   Psychosocial issues?  Yes   Psychosocial comments  pt quit Methadone maintenance prior to hospital admission, states has not planned on starting back on Methadone, although having trouble sleeping, and would like to connect with behavioral  health clinic locally, gave pt phone #, pt does not have AVS, pt states very motivated to see  professionals   Comments  pt came from [facility], staying with granparents in [place], [place]   Did the patient receive a copy of their  discharge instructions?  No [pt given phone numbers and access code to get on mYCHART to get copy of AVS]   Nursing interventions  Reviewed instructions with patient, Educated on MyChart   What is the patient's perception of their health status since discharge?  Improving   Nursing interventions  Nurse provided patient education   Is the patient/caregiver able to teach back Sepsis?  S - Shivering,fever or very cold, E - Extreme pain or generalized discomfort (worst ever,especially abdomen), P - Pale or discolored skin, S - Sleepy, difficult to arouse,confused, I -   I feel like I might die-a feeling of hopelessness, S - Short of breath   Nursing interventions  Nurse provided reassurance to patient, Nurse provided patient education   Is patient/caregiver able to teach back steps to recovery at home?  Set small, achievable goals for return to baseline health, Rest and regain strength, Talk about feelings with family/friends   Is the patient/caregiver able to teach back signs and symptoms of worsening condition:  Fever, Rapid heart rate (>90), Altered mental status(confusion/coma), Hyperthermia, Shortness of breath/rapid respiratory rate, Edema   Is the patient/caregiver able to teach back the hierarchy of who to call/visit for symptoms/problems? PCP, Specialist, Home health nurse, Urgent Care, ED, 911  Yes   Additional teach back comments  pt states having diarrhea and weight loss, instructed pt to contact [facility] or [facility] if diarrhea or weight loss persists, pt agreed with plan    Week 1 call completed?  Yes          Jasmin Ureña RN

## 2020-01-17 ENCOUNTER — HOSPITAL ENCOUNTER (EMERGENCY)
Facility: HOSPITAL | Age: 30
Discharge: HOME | End: 2020-01-17
Attending: EMERGENCY MEDICINE | Admitting: EMERGENCY MEDICINE

## 2020-01-17 ENCOUNTER — NURSE TRIAGE (OUTPATIENT)
Dept: CALL CENTER | Facility: HOSPITAL | Age: 30
End: 2020-01-17

## 2020-01-17 VITALS
DIASTOLIC BLOOD PRESSURE: 89 MMHG | WEIGHT: 163 LBS | HEIGHT: 63 IN | OXYGEN SATURATION: 96 % | TEMPERATURE: 97.4 F | SYSTOLIC BLOOD PRESSURE: 135 MMHG | BODY MASS INDEX: 28.88 KG/M2 | RESPIRATION RATE: 20 BRPM | HEART RATE: 97 BPM

## 2020-01-17 DIAGNOSIS — A59.9 TRICHOMONAS VAGINALIS INFECTION: Primary | ICD-10-CM

## 2020-01-17 LAB
ALBUMIN SERPL-MCNC: 4.1 G/DL (ref 3.5–5.2)
ALBUMIN/GLOB SERPL: 0.9 G/DL
ALP SERPL-CCNC: 93 U/L (ref 39–117)
ALT SERPL W P-5'-P-CCNC: 17 U/L (ref 1–33)
ANION GAP SERPL CALCULATED.3IONS-SCNC: 16.6 MMOL/L (ref 5–15)
AST SERPL-CCNC: 14 U/L (ref 1–32)
B-HCG UR QL: NEGATIVE
BACTERIA UR QL AUTO: ABNORMAL /HPF
BASOPHILS # BLD AUTO: 0.05 10*3/MM3 (ref 0–0.2)
BASOPHILS NFR BLD AUTO: 0.4 % (ref 0–1.5)
BILIRUB SERPL-MCNC: 0.3 MG/DL (ref 0.2–1.2)
BILIRUB UR QL STRIP: NEGATIVE
BUN BLD-MCNC: 9 MG/DL (ref 6–20)
BUN/CREAT SERPL: 13.2 (ref 7–25)
CALCIUM SPEC-SCNC: 9.9 MG/DL (ref 8.6–10.5)
CHLORIDE SERPL-SCNC: 103 MMOL/L (ref 98–107)
CLARITY UR: CLEAR
CO2 SERPL-SCNC: 19.4 MMOL/L (ref 22–29)
COLOR UR: YELLOW
CREAT BLD-MCNC: 0.68 MG/DL (ref 0.57–1)
DEPRECATED RDW RBC AUTO: 50.2 FL (ref 37–54)
EOSINOPHIL # BLD AUTO: 0.02 10*3/MM3 (ref 0–0.4)
EOSINOPHIL NFR BLD AUTO: 0.2 % (ref 0.3–6.2)
ERYTHROCYTE [DISTWIDTH] IN BLOOD BY AUTOMATED COUNT: 15.9 % (ref 12.3–15.4)
GFR SERPL CREATININE-BSD FRML MDRD: 103 ML/MIN/1.73
GLOBULIN UR ELPH-MCNC: 4.6 GM/DL
GLUCOSE BLD-MCNC: 96 MG/DL (ref 65–99)
GLUCOSE UR STRIP-MCNC: NEGATIVE MG/DL
HCT VFR BLD AUTO: 36.1 % (ref 34–46.6)
HGB BLD-MCNC: 12.2 G/DL (ref 12–15.9)
HGB UR QL STRIP.AUTO: ABNORMAL
HYALINE CASTS UR QL AUTO: ABNORMAL /LPF
IMM GRANULOCYTES # BLD AUTO: 0.12 10*3/MM3 (ref 0–0.05)
IMM GRANULOCYTES NFR BLD AUTO: 1 % (ref 0–0.5)
KETONES UR QL STRIP: NEGATIVE
LEUKOCYTE ESTERASE UR QL STRIP.AUTO: ABNORMAL
LYMPHOCYTES # BLD AUTO: 2.05 10*3/MM3 (ref 0.7–3.1)
LYMPHOCYTES NFR BLD AUTO: 17.2 % (ref 19.6–45.3)
MAGNESIUM SERPL-MCNC: 1.7 MG/DL (ref 1.6–2.6)
MCH RBC QN AUTO: 29.4 PG (ref 26.6–33)
MCHC RBC AUTO-ENTMCNC: 33.8 G/DL (ref 31.5–35.7)
MCV RBC AUTO: 87 FL (ref 79–97)
MONOCYTES # BLD AUTO: 0.66 10*3/MM3 (ref 0.1–0.9)
MONOCYTES NFR BLD AUTO: 5.5 % (ref 5–12)
MUCOUS THREADS URNS QL MICRO: ABNORMAL /HPF
NEUTROPHILS # BLD AUTO: 9.01 10*3/MM3 (ref 1.7–7)
NEUTROPHILS NFR BLD AUTO: 75.7 % (ref 42.7–76)
NITRITE UR QL STRIP: NEGATIVE
NRBC BLD AUTO-RTO: 0 /100 WBC (ref 0–0.2)
PH UR STRIP.AUTO: 6.5 [PH] (ref 4.5–8)
PLATELET # BLD AUTO: 545 10*3/MM3 (ref 140–450)
PMV BLD AUTO: 9.2 FL (ref 6–12)
POTASSIUM BLD-SCNC: 4.6 MMOL/L (ref 3.5–5.2)
PROT SERPL-MCNC: 8.7 G/DL (ref 6–8.5)
PROT UR QL STRIP: NEGATIVE
RBC # BLD AUTO: 4.15 10*6/MM3 (ref 3.77–5.28)
RBC # UR: ABNORMAL /HPF
REF LAB TEST METHOD: ABNORMAL
SODIUM BLD-SCNC: 139 MMOL/L (ref 136–145)
SP GR UR STRIP: 1.02 (ref 1–1.03)
SQUAMOUS #/AREA URNS HPF: ABNORMAL /HPF
TRICHOMONAS #/AREA URNS HPF: ABNORMAL /HPF
UROBILINOGEN UR QL STRIP: ABNORMAL
WBC NRBC COR # BLD: 11.91 10*3/MM3 (ref 3.4–10.8)
WBC UR QL AUTO: ABNORMAL /HPF

## 2020-01-17 PROCEDURE — 85025 COMPLETE CBC W/AUTO DIFF WBC: CPT | Performed by: PHYSICIAN ASSISTANT

## 2020-01-17 PROCEDURE — 99284 EMERGENCY DEPT VISIT MOD MDM: CPT

## 2020-01-17 PROCEDURE — 81001 URINALYSIS AUTO W/SCOPE: CPT | Performed by: PHYSICIAN ASSISTANT

## 2020-01-17 PROCEDURE — 81025 URINE PREGNANCY TEST: CPT | Performed by: PHYSICIAN ASSISTANT

## 2020-01-17 PROCEDURE — 96372 THER/PROPH/DIAG INJ SC/IM: CPT

## 2020-01-17 PROCEDURE — 83735 ASSAY OF MAGNESIUM: CPT | Performed by: PHYSICIAN ASSISTANT

## 2020-01-17 PROCEDURE — 25010000002 CEFTRIAXONE PER 250 MG: Performed by: PHYSICIAN ASSISTANT

## 2020-01-17 PROCEDURE — 99284 EMERGENCY DEPT VISIT MOD MDM: CPT | Performed by: PHYSICIAN ASSISTANT

## 2020-01-17 PROCEDURE — 80053 COMPREHEN METABOLIC PANEL: CPT | Performed by: PHYSICIAN ASSISTANT

## 2020-01-17 RX ORDER — AZITHROMYCIN 250 MG/1
1000 TABLET, FILM COATED ORAL ONCE
Status: COMPLETED | OUTPATIENT
Start: 2020-01-17 | End: 2020-01-17

## 2020-01-17 RX ORDER — VANCOMYCIN HYDROCHLORIDE 250 MG/1
250 CAPSULE ORAL ONCE
Status: COMPLETED | OUTPATIENT
Start: 2020-01-17 | End: 2020-01-17

## 2020-01-17 RX ORDER — METRONIDAZOLE 500 MG/1
500 TABLET ORAL 2 TIMES DAILY
Qty: 14 TABLET | Refills: 0 | Status: SHIPPED | OUTPATIENT
Start: 2020-01-17 | End: 2020-01-28

## 2020-01-17 RX ORDER — SODIUM CHLORIDE 0.9 % (FLUSH) 0.9 %
10 SYRINGE (ML) INJECTION AS NEEDED
Status: DISCONTINUED | OUTPATIENT
Start: 2020-01-17 | End: 2020-01-17 | Stop reason: HOSPADM

## 2020-01-17 RX ORDER — ONDANSETRON 2 MG/ML
4 INJECTION INTRAMUSCULAR; INTRAVENOUS ONCE
Status: DISCONTINUED | OUTPATIENT
Start: 2020-01-17 | End: 2020-01-17 | Stop reason: HOSPADM

## 2020-01-17 RX ORDER — CEFTRIAXONE SODIUM 250 MG/1
250 INJECTION, POWDER, FOR SOLUTION INTRAMUSCULAR; INTRAVENOUS ONCE
Status: COMPLETED | OUTPATIENT
Start: 2020-01-17 | End: 2020-01-17

## 2020-01-17 RX ORDER — ONDANSETRON 4 MG/1
4 TABLET, ORALLY DISINTEGRATING ORAL ONCE
Status: COMPLETED | OUTPATIENT
Start: 2020-01-17 | End: 2020-01-17

## 2020-01-17 RX ORDER — LIDOCAINE HYDROCHLORIDE 10 MG/ML
0.9 INJECTION, SOLUTION EPIDURAL; INFILTRATION; INTRACAUDAL; PERINEURAL ONCE
Status: COMPLETED | OUTPATIENT
Start: 2020-01-17 | End: 2020-01-17

## 2020-01-17 RX ADMIN — LIDOCAINE HYDROCHLORIDE 0.9 ML: 10 INJECTION, SOLUTION EPIDURAL; INFILTRATION; INTRACAUDAL; PERINEURAL at 19:10

## 2020-01-17 RX ADMIN — CEFTRIAXONE SODIUM 250 MG: 250 INJECTION, POWDER, FOR SOLUTION INTRAMUSCULAR; INTRAVENOUS at 19:08

## 2020-01-17 RX ADMIN — VANCOMYCIN HYDROCHLORIDE 250 MG: 250 CAPSULE ORAL at 17:03

## 2020-01-17 RX ADMIN — ONDANSETRON 4 MG: 4 TABLET, ORALLY DISINTEGRATING ORAL at 17:07

## 2020-01-17 RX ADMIN — AZITHROMYCIN 1000 MG: 250 TABLET, FILM COATED ORAL at 19:06

## 2020-01-17 NOTE — DISCHARGE INSTRUCTIONS
Return to the emergency department with worsening symptoms, uncontrolled pain, inability to tolerate oral liquids, fever greater than 101°F not controlled by Tylenol or as needed with emergent concerns.    Continue with probiotics

## 2020-01-17 NOTE — ED PROVIDER NOTES
"Subjective   History of Present Illness  History of Present Illness    Chief complaint: Diarrhea    Location: lower abdomen    Quality/Severity:  Watery, mucousy. severe    Timing/Duration: 10 days, worsening. >10 episodes/day with accidents and night time awakening    Modifying Factors: nothing makes better or worse. \"completed vanc\"    Associated Symptoms: +N/V x2. + diffuse lower abd cramping, + constant dull, generalized HA,  + 20 pound wt loss in the past 2 weeks.  Positive chills, but no fever.    Narrative: 29-year-old female presents with diarreha as above.  She was recently admitted to the hospital on 1/6- 1/10/2024 sepsis secondary to acute right E. coli pyelonephritis and C. difficile colitis.  She was discharged on probiotics, oral vancomycin (total of 15 days), and cefdinir (5 days). Pt states she's no longer taking the oral vancomycin because she completed the course, however it has not been 15 days.     Review of Systems   Constitutional: Positive for chills and unexpected weight change. Negative for fatigue and fever.   Eyes: Negative.    Respiratory: Negative.  Negative for cough and shortness of breath.    Cardiovascular: Negative.  Negative for chest pain.   Gastrointestinal: Positive for abdominal pain, diarrhea (Worsening), nausea and vomiting.   Endocrine: Negative.    Genitourinary: Negative.  Negative for dysuria, flank pain, frequency, pelvic pain, urgency, vaginal bleeding and vaginal discharge.   Musculoskeletal: Negative.    Skin: Negative.    Neurological: Positive for headaches. Negative for dizziness and light-headedness.   Hematological: Negative.    Psychiatric/Behavioral: Positive for behavioral problems.   All other systems reviewed and are negative.      Past Medical History:   Diagnosis Date   • Addiction to drug (CMS/[facility])    • Depression        Allergies   Allergen Reactions   • Trazodone GI Intolerance       History reviewed. No pertinent surgical history.    Family History "   Problem Relation Age of Onset   • No Known Problems Mother    • No Known Problems Father    • No Known Problems Sister    • No Known Problems Brother        Social History     Socioeconomic History   • Marital status: Unknown     Spouse name: Not on file   • Number of children: Not on file   • Years of education: Not on file   • Highest education level: Not on file   Tobacco Use   • Smoking status: Current Every Day Smoker     Packs/day: 1.00   • Smokeless tobacco: Never Used   Substance and Sexual Activity   • Alcohol use: Not Currently   • Drug use: Not Currently   • Sexual activity: Not Currently     No current facility-administered medications for this encounter.     Current Outpatient Medications:   •  ALPRAZolam (XANAX) 1 MG tablet, Take 1 tablet by mouth 2 (Two) Times a Day for 7 days., Disp: 14 tablet, Rfl: 0  •  FLUoxetine (PROzac) 20 MG capsule, Take 1 capsule by mouth Daily., Disp: 30 capsule, Rfl: 1  •  lactobacillus acidophilus (RISAQUAD) capsule capsule, Take 1 capsule by mouth Daily., Disp: 30 capsule, Rfl: 0  •  melatonin 5 MG tablet tablet, Take 1 tablet by mouth Every Night., Disp: , Rfl:   •  nicotine (NICODERM CQ) 21 MG/24HR patch, Place 1 patch on the skin as directed by provider Daily., Disp: 21 each, Rfl: 0  •  ondansetron (ZOFRAN) 4 MG tablet, Take 1 tablet by mouth Every 6 (Six) Hours As Needed for Nausea or Vomiting., Disp: 20 tablet, Rfl: 0  •  vancomycin (VANCOCIN) 125 MG capsule, Take 1 capsule by mouth 4 (Four) Times a Day for 16 days. Indications: Colon Inflammation due to Clostridium Bacteria Overgrowth, Disp: 64 capsule, Rfl: 0        Objective   Physical Exam  Vitals:    01/17/20 1430   BP: 140/94   Pulse: 102   Resp: 20   Temp: 97.4 °F (36.3 °C)   SpO2: 98%     GENERAL: a/o x 4, NAD  SKIN: Warm pink and dry   HEENT:  PERRLA, EOM intact, conjunctiva normal, sclera clear  NECK: supple.  No nuchal rigidity  LUNGS: Clear to auscultation bilaterally without wheezes, rales or rhonchi.   No accessory muscle use and no nasal flaring.  CARDIAC:  Regular rate and rhythm, S1-S2.  No murmurs, rubs or gallops.  No peripheral edema.  Equal pulses bilaterally.  ABDOMEN: Soft, nontender, nondistended.  No guarding or rebound tenderness.  Normal bowel sounds.  MUSCULOSKELETAL: Moves all extremities well.  No deformity.  NEURO: Cranial nerves II through XII grossly intact.  No gross focal deficits.  Alert.  Normal speech and motor.  PSYCH: Normal mood and affect      Procedures           ED Course  ED Course as of Jan 17 1853 Fri Jan 17, 2020   1639 1L LR, zofran, po vanc given    [[place]]   1659 Difficulty getting blood and IV.  Used vein finder.  Veins tend to blow.  PICC team on way    [[place]]   1711 Contaminated.     Squamous Epithelial Cells, UA(!): 13-20 [[place]]   1711 Will tx with flagyl.    Trichomonas, UA(!): Moderate/2+ [[place]]   1722 PICC person will not place picc in her.  Offered fem stick to draw blood or place central line.  Pt refuses.  Will see if lab can at least draw blood.    [[place]]   1727 Pt has not had diarrhea since arrival    [[place]]   1824 Pt declines pelvic exam.  She does now admit to vaginal discharge.  She would like to go ahead and be treated for gonorrhea and chlamydia.  She does understand this may make her C. difficile worse with the added antibiotics.  She wants to be treated now and not wait for results.  Azith/rocephin given    [[place]]   1826 contaminated   Squamous Epithelial Cells, UA(!): 13-20 [[place]]   1846 Encouraged fluids.    CO2(!): 19.4 [[place]]   1847 Patient's abdominal exam is benign.  Her white count is only 11.9.  She is afebrile.  No indication for admission at this time.  Encouraged rehydration, since pt is a difficult stick. Patient has not had any diarrhea since arrival.  Discharged with Flagyl for her trichomonas.  Encouraged continued probiotics.  Follow-up primary care.    Education given.Discussed pertinent labs and imaging findings with the patient/family.  Patient/Family  voiced understanding of need to follow-up for recheck, further testing as needed.  Return to the emergency Department warnings were given.      [[place]]      ED Course User Index  [[place]] Keke Smalls, JELENA      Results for orders placed or performed during the hospital encounter of 01/17/20   Comprehensive Metabolic Panel   Result Value Ref Range    Glucose 96 65 - 99 mg/dL    BUN 9 6 - 20 mg/dL    Creatinine 0.68 0.57 - 1.00 mg/dL    Sodium 139 136 - 145 mmol/L    Potassium 4.6 3.5 - 5.2 mmol/L    Chloride 103 98 - 107 mmol/L    CO2 19.4 (L) 22.0 - 29.0 mmol/L    Calcium 9.9 8.6 - 10.5 mg/dL    Total Protein 8.7 (H) 6.0 - 8.5 g/dL    Albumin 4.10 3.50 - 5.20 g/dL    ALT (SGPT) 17 1 - 33 U/L    AST (SGOT) 14 1 - 32 U/L    Alkaline Phosphatase 93 39 - 117 U/L    Total Bilirubin 0.3 0.2 - 1.2 mg/dL    eGFR Non African Amer 103 >60 mL/min/1.73    Globulin 4.6 gm/dL    A/G Ratio 0.9 g/dL    BUN/Creatinine Ratio 13.2 7.0 - 25.0    Anion Gap 16.6 (H) 5.0 - 15.0 mmol/L   Urinalysis With Microscopic If Indicated (No Culture) - Urine, Clean Catch   Result Value Ref Range    Color, UA Yellow Yellow, Straw    Appearance, UA Clear Clear    pH, UA 6.5 4.5 - 8.0    Specific Gravity, UA 1.020 1.003 - 1.030    Glucose, UA Negative Negative    Ketones, UA Negative Negative    Bilirubin, UA Negative Negative    Blood, UA Trace (A) Negative    Protein, UA Negative Negative    Leuk Esterase, UA Moderate (2+) (A) Negative    Nitrite, UA Negative Negative    Urobilinogen, UA 0.2 E.U./dL 0.2 - 1.0 E.U./dL   Magnesium   Result Value Ref Range    Magnesium 1.7 1.6 - 2.6 mg/dL   CBC Auto Differential   Result Value Ref Range    WBC 11.91 (H) 3.40 - 10.80 10*3/mm3    RBC 4.15 3.77 - 5.28 10*6/mm3    Hemoglobin 12.2 12.0 - 15.9 g/dL    Hematocrit 36.1 34.0 - 46.6 %    MCV 87.0 79.0 - 97.0 fL    MCH 29.4 26.6 - 33.0 pg    MCHC 33.8 31.5 - 35.7 g/dL    RDW 15.9 (H) 12.3 - 15.4 %    RDW-SD 50.2 37.0 - 54.0 fl    MPV 9.2 6.0 - 12.0 fL    Platelets  545 (H) 140 - 450 10*3/mm3    Neutrophil % 75.7 42.7 - 76.0 %    Lymphocyte % 17.2 (L) 19.6 - 45.3 %    Monocyte % 5.5 5.0 - 12.0 %    Eosinophil % 0.2 (L) 0.3 - 6.2 %    Basophil % 0.4 0.0 - 1.5 %    Immature Grans % 1.0 (H) 0.0 - 0.5 %    Neutrophils, Absolute 9.01 (H) 1.70 - 7.00 10*3/mm3    Lymphocytes, Absolute 2.05 0.70 - 3.10 10*3/mm3    Monocytes, Absolute 0.66 0.10 - 0.90 10*3/mm3    Eosinophils, Absolute 0.02 0.00 - 0.40 10*3/mm3    Basophils, Absolute 0.05 0.00 - 0.20 10*3/mm3    Immature Grans, Absolute 0.12 (H) 0.00 - 0.05 10*3/mm3    nRBC 0.0 0.0 - 0.2 /100 WBC   Pregnancy, Urine - Urine, Clean Catch   Result Value Ref Range    HCG, Urine QL Negative Negative   Urinalysis, Microscopic Only - Urine, Clean Catch   Result Value Ref Range    RBC, UA 3-5 (A) None Seen /HPF    WBC, UA 31-50 (A) None Seen /HPF    Bacteria, UA 2+ (A) None Seen /HPF    Squamous Epithelial Cells, UA 13-20 (A) None Seen, 0-2 /HPF    Hyaline Casts, UA None Seen None Seen /LPF    Mucus, UA Small/1+ (A) None Seen, Trace /HPF    Trichomonas, UA Moderate/2+ (A) None Seen /HPF    Methodology Manual Light Microscopy                  MDM  Number of Diagnoses or Management Options  Trichomonas vaginalis infection: new and requires workup     Amount and/or Complexity of Data Reviewed  Clinical lab tests: reviewed and ordered  Tests in the medicine section of CPT®: ordered and reviewed  Decide to obtain previous medical records or to obtain history from someone other than the patient: yes    Risk of Complications, Morbidity, and/or Mortality  Presenting problems: moderate  Diagnostic procedures: moderate  Management options: moderate    Patient Progress  Patient progress: improved    My differential diagnosis for abdominal pain includes but is not limited to:  Gastritis, gastroenteritis, peptic ulcer disease, GERD, esophageal perforation, acute appendicitis, mesenteric adenitis, Meckel’s diverticulum, epiploic appendagitis, diverticulitis,  colon cancer, ulcerative colitis, Crohn’s disease, intussusception, small bowel obstruction, adhesions, ischemic bowel, perforated viscus, ileus, obstipation, biliary colic, cholecystitis, cholelithiasis, [place], hepatitis, pancreatitis, common bile duct obstruction, cholangitis, bile leak, splenic trauma, splenic rupture, splenic infarction, splenic abscess, abdominal abscess, ascites, spontaneous bacterial peritonitis, hernia, UTI, cystitis,ureterolithiasis, urinary obstruction, ovarian cyst, torsion, pregnancy, ectopic pregnancy, PID, pelvic abscess, mittelschmerz, endometriosis, AAA, myocardial infarction, pneumonia, cancer, porphyria, DKA, medications, sickle cell, viral syndrome, zoster      Final diagnoses:   Trichomonas vaginalis infection     Dictated utilizing Dragon dictation         Keke Smalls PA-C  01/17/20 0070

## 2020-01-17 NOTE — TELEPHONE ENCOUNTER
"She was in the hospital recently on 01/06/2020- 01/10/2020- She was in the hospital recently for Pyelonephritis. She states she is having fever and chills and not feeling well. She did have C diff. She wants to know what to do. Advised to go to the ED. She has been having acute methadone and benzodiazepine withdrawal     Reason for Disposition  • General information question, no triage required and triager able to answer question    Additional Information  • Negative: [1] Caller is not with the adult (patient) AND [2] reporting urgent symptoms  • Negative: Lab result questions  • Negative: Medication questions  • Negative: Caller can't be reached by phone  • Negative: Caller has already spoken to PCP or another triager  • Negative: RN needs further essential information from caller in order to complete triage  • Negative: Requesting regular office appointment  • Negative: [1] Caller requesting NON-URGENT health information AND [2] PCP's office is the best resource  • Negative: Health Information question, no triage required and triager able to answer question    Answer Assessment - Initial Assessment Questions  1. REASON FOR CALL or QUESTION: \"What is your reason for calling today?\" or \"How can I best help you?\" or \"What question do you have that I can help answer?\"      See note    Protocols used: INFORMATION ONLY CALL-ADULT-      "

## 2020-01-23 ENCOUNTER — READMISSION MANAGEMENT (OUTPATIENT)
Dept: CALL CENTER | Facility: HOSPITAL | Age: 30
End: 2020-01-23

## 2020-01-23 NOTE — OUTREACH NOTE
Sepsis Week 2 Survey      Responses   Facility patient discharged from?  [facility]   Does the patient have one of the following disease processes/diagnoses(primary or secondary)?  Sepsis   Week 2 attempt successful?  Yes   Call start time  1509   Revoke  Decline to participate [Spoke with grandparent. Stacie reported she didn't believe patient had been to [facility], but she believed she had an appointment. Not sure if Stacie is UTD on patients medical history. ]   Call end time  1512   Discharge diagnosis  Sepsis d/t acute right E. Coli Pyelonephritis, Acute CDC, hx skin abscesses, acute methadone and benzo withdrawal   Person spoke with today (if not patient) and relationship  Cara-grandparent   Meds reviewed with patient/caregiver?  Yes   Is the patient taking all medications as directed (includes completed medication regime)?  Yes   Has the patient kept scheduled appointments due by today?  N/A   What is the patient's perception of their health status since discharge?  Improving [Pt still has diarrhea]   Nursing interventions  Nurse provided patient education   Is the patient/caregiver able to teach back Sepsis?  S - Shivering,fever or very cold, S - Short of breath   Nursing interventions  Nurse provided patient education   Is patient/caregiver able to teach back steps to recovery at home?  Eat a balanced diet [Not eating well]   Is the patient/caregiver able to teach back signs and symptoms of worsening condition:  Fever, Hyperthermia, Shortness of breath/rapid respiratory rate   If the patient is a current smoker, are they able to teach back resources for cessation?  -- [Pt is a smoker]   Week 2 call completed?  Yes          Shima Wing RN

## 2020-01-28 ENCOUNTER — OFFICE VISIT (OUTPATIENT)
Dept: FAMILY MEDICINE CLINIC | Facility: CLINIC | Age: 30
End: 2020-01-28

## 2020-01-28 VITALS
SYSTOLIC BLOOD PRESSURE: 128 MMHG | BODY MASS INDEX: 29.06 KG/M2 | HEART RATE: 102 BPM | DIASTOLIC BLOOD PRESSURE: 80 MMHG | WEIGHT: 164 LBS | TEMPERATURE: 98.4 F | HEIGHT: 63 IN | OXYGEN SATURATION: 98 %

## 2020-01-28 DIAGNOSIS — E66.3 OVERWEIGHT (BMI 25.0-29.9): Primary | ICD-10-CM

## 2020-01-28 DIAGNOSIS — N12 PYELONEPHRITIS: ICD-10-CM

## 2020-01-28 DIAGNOSIS — F41.9 ANXIETY: ICD-10-CM

## 2020-01-28 DIAGNOSIS — F33.1 MODERATE EPISODE OF RECURRENT MAJOR DEPRESSIVE DISORDER (HCC): ICD-10-CM

## 2020-01-28 PROCEDURE — 99204 OFFICE O/P NEW MOD 45 MIN: CPT | Performed by: FAMILY MEDICINE

## 2020-01-28 RX ORDER — VANCOMYCIN HYDROCHLORIDE 125 MG/1
125 CAPSULE ORAL 4 TIMES DAILY
COMMUNITY
End: 2020-01-30

## 2020-01-28 RX ORDER — ALPRAZOLAM 1 MG/1
1 TABLET ORAL 2 TIMES DAILY PRN
COMMUNITY

## 2020-01-28 NOTE — PROGRESS NOTES
"Subjective   Talia Root is a 29 y.o. female is here for   Chief Complaint   Patient presents with   • Anxiety   • Depression       History of Present Illness     Pt gas history of drug abuse.  She was recently in a [place] [facility] facility.    She was recently hospitalized for C. Diff.  She is currently taking Vancomycin and has finished the Flagyl.  She states her C. difficile is getting much better, but she still has some diarrhea.  No blood or mucus in her stool.  She no longer has any nausea, vomiting, fever, or chills.    She had pyelonephritis and is finished with her cefdinir course.  She is no longer having urinary symptoms.    She smoked 1 PPD.     She has depression that was affecting her activities of daily living.  She takes fluoxetine that has been helping her depression..    She states she has anxiety and depression. She states Dr. Sahara Lugo, psychiatry has been prescribing her Xanax 1 mg BID.  She states Dr. Lugo is not going to prescribe them anymore because she is in [place].  Pt states she is going to try to find a psychiatrist. She states she has 7 of Xanax tablets left.  She states Dr. Lugo has been prescribing Xanax for 2 years.  Before that it was Ativan and before that it was Clonazepam.  She states she has been on a BZD for 4 years.  She states she moved to [place] on January 5, 2020. She states she accidentally left her Methadone and Xanax pills in [place].  Her [facility] ED visit note from 1-6-20 states \"She has been on methadone and Xanax last doses before [place].\" The patient denies this and states she has remained on Xanax and that she has never had an addiction to benzodiazepines.  She states she has 2 refills left on her Xanax from Dr. Lugo in [place].  She states she is going to get an appointment with a psychiatrist.    She states she feels well today and does not feel sick. She is not having any concerning symptoms or problems and does not want any testing of any kind " today.    The following portions of the patient's history were reviewed and updated as appropriate: allergies, current medications, past family history, past medical history, past social history, past surgical history and problem list.     reports that she has been smoking. She has been smoking about 1.00 pack per day. She has never used smokeless tobacco. She reports that she drinks alcohol. She reports that she has current or past drug history. Drug: Opium.    Review of Systems   Constitutional: Positive for activity change and unexpected weight change.   HENT: Negative for congestion.    Respiratory: Negative for shortness of breath and wheezing.    Cardiovascular: Positive for palpitations. Negative for chest pain.   Gastrointestinal: Positive for abdominal pain and diarrhea. Negative for blood in stool and constipation.   Genitourinary: Negative for difficulty urinating and hematuria.   Musculoskeletal: Negative for gait problem.   Skin: Negative for color change and rash.        PHQ-9 Depression Screening  Little interest or pleasure in doing things? 1   Feeling down, depressed, or hopeless? 1   Trouble falling or staying asleep, or sleeping too much? 3   Feeling tired or having little energy? 0   Poor appetite or overeating? 1   Feeling bad about yourself - or that you are a failure or have let yourself or your family down? 0   Trouble concentrating on things, such as reading the newspaper or watching television? 2   Moving or speaking so slowly that other people could have noticed? Or the opposite - being so fidgety or restless that you have been moving around a lot more than usual? 2   Thoughts that you would be better off dead, or of hurting yourself in some way? 0   PHQ-9 Total Score 10   If you checked off any problems, how difficult have these problems made it for you to do your work, take care of things at home, or get along with other people? Very difficult         Objective   /80 (BP Location:  "Left arm, Patient Position: Sitting, Cuff Size: Adult)   Pulse 102   Temp 98.4 °F (36.9 °C)   Ht 160 cm (63\")   Wt 74.4 kg (164 lb)   LMP 12/30/2019   SpO2 98%   BMI 29.05 kg/m²   Physical Exam   Constitutional: She is oriented to person, place, and time. She appears well-developed and well-nourished. No distress.   HENT:   Head: Normocephalic and atraumatic.   Right Ear: External ear normal.   Left Ear: External ear normal.   Nose: Nose normal.   Mouth/Throat: Oropharynx is clear and moist. No oropharyngeal exudate.   Eyes: Lids are normal. Right eye exhibits no discharge. Left eye exhibits no discharge. No scleral icterus.   Neck: Trachea normal, normal range of motion and full passive range of motion without pain. Neck supple. No tracheal deviation and no edema present. No thyromegaly present.   Cardiovascular: Normal rate, regular rhythm, normal heart sounds and intact distal pulses. Exam reveals no gallop and no friction rub.   No murmur heard.  Pulmonary/Chest: Effort normal and breath sounds normal. No stridor. No tachypnea and no bradypnea. No respiratory distress. She has no decreased breath sounds. She has no wheezes. She has no rales. She exhibits no tenderness.   Abdominal: Normal appearance. There is no hepatosplenomegaly.   Musculoskeletal: She exhibits no edema.   Lymphadenopathy:        Head (right side): No submental, no submandibular, no tonsillar, no preauricular, no posterior auricular and no occipital adenopathy present.        Head (left side): No submental, no submandibular, no tonsillar, no preauricular, no posterior auricular and no occipital adenopathy present.     She has no cervical adenopathy.        Right cervical: No superficial cervical, no deep cervical and no posterior cervical adenopathy present.       Left cervical: No superficial cervical, no deep cervical and no posterior cervical adenopathy present.   Neurological: She is alert and oriented to person, place, and time. " She has normal strength and normal reflexes. She is not disoriented.   Skin: Skin is warm, dry and intact. Capillary refill takes less than 2 seconds. No rash noted. She is not diaphoretic. No cyanosis or erythema. No pallor. Nails show no clubbing.   Psychiatric: She has a normal mood and affect. Her behavior is normal. Cognition and memory are normal.   Nursing note and vitals reviewed.      Procedures    Assessment/Plan   Diagnoses and all orders for this visit:    1. Overweight (BMI 25.0-29.9) (Primary)  Assessment & Plan:  she is going to work on improving her diet and exercise to lose weight.        2. Pyelonephritis  Assessment & Plan:  Resolved      3. Moderate episode of recurrent major depressive disorder (CMS/HCC)  Assessment & Plan:  Call today to get an appointment with a psychiatrist.      4. Anxiety  Assessment & Plan:  She is going to call today to get an appointment with a psychiatrist and psychologist.    Xanax taper take 1/2 pill twice daily for 4 days, then 1/2 tablet once daily for 4 days, then discontinue.

## 2020-01-28 NOTE — PATIENT INSTRUCTIONS
Call today and make an appointment with a psychiatrist [facility].  Call [facility] and get an appointment.  Check your Xanax prescription bottle today. If you do not have any refills on your Xanax from your psychiatrist, then taper your 1 mg Xanax tablets by taking 1/2 tablet twice daily for 4 days, then 1/2 tablet once daily for 4 days, then discontinue.  If you have refills remaining and have received instructions from your psychiatrist to stay on them, then do so until your appointment with your psychiatrist here in [place].

## 2020-01-30 ENCOUNTER — OFFICE VISIT (OUTPATIENT)
Dept: INTERNAL MEDICINE | Facility: CLINIC | Age: 30
End: 2020-01-30

## 2020-01-30 VITALS
DIASTOLIC BLOOD PRESSURE: 80 MMHG | HEIGHT: 64 IN | WEIGHT: 162.8 LBS | HEART RATE: 98 BPM | SYSTOLIC BLOOD PRESSURE: 124 MMHG | BODY MASS INDEX: 27.79 KG/M2 | RESPIRATION RATE: 16 BRPM | OXYGEN SATURATION: 97 % | TEMPERATURE: 98.4 F

## 2020-01-30 DIAGNOSIS — F31.9 BIPOLAR DEPRESSION (HCC): Primary | ICD-10-CM

## 2020-01-30 DIAGNOSIS — F41.0 PANIC DISORDER: ICD-10-CM

## 2020-01-30 DIAGNOSIS — Z72.51 HIGH RISK HETEROSEXUAL BEHAVIOR: ICD-10-CM

## 2020-01-30 PROBLEM — N12 PYELONEPHRITIS: Status: RESOLVED | Noted: 2020-01-06 | Resolved: 2020-01-30

## 2020-01-30 PROCEDURE — 99203 OFFICE O/P NEW LOW 30 MIN: CPT | Performed by: NURSE PRACTITIONER

## 2020-01-31 ENCOUNTER — APPOINTMENT (OUTPATIENT)
Dept: SLEEP MEDICINE | Facility: HOSPITAL | Age: 30
End: 2020-01-31

## 2020-02-02 PROBLEM — Z72.51 HIGH RISK HETEROSEXUAL BEHAVIOR: Status: ACTIVE | Noted: 2020-02-02

## 2020-02-07 ENCOUNTER — APPOINTMENT (OUTPATIENT)
Dept: SLEEP MEDICINE | Facility: HOSPITAL | Age: 30
End: 2020-02-07
